# Patient Record
Sex: MALE | Race: WHITE | Employment: PART TIME | URBAN - METROPOLITAN AREA
[De-identification: names, ages, dates, MRNs, and addresses within clinical notes are randomized per-mention and may not be internally consistent; named-entity substitution may affect disease eponyms.]

---

## 2017-04-11 ENCOUNTER — TELEPHONE (OUTPATIENT)
Dept: OTHER | Facility: CLINIC | Age: 37
End: 2017-04-11

## 2017-04-11 NOTE — TELEPHONE ENCOUNTER
4/11/2017    Call Regarding Onboarding [facility]    Attempt 1    Message on voicemail     Comments: NO DEP        Outreach   Jaclyn Dixon

## 2017-05-31 NOTE — TELEPHONE ENCOUNTER
5/31/2017    Call Regarding Onboarding [facility]    Attempt 2    Message on voicemail     Comments:       Outreach   bety

## 2017-06-07 NOTE — TELEPHONE ENCOUNTER
Call Regarding [facility]boarding [facility] CHO[facility]    Attempt 3    Message on voicemail     Comments:       Outreach   Amelia Padilla

## 2018-12-15 ENCOUNTER — OFFICE VISIT (OUTPATIENT)
Dept: URGENT CARE | Facility: URGENT CARE | Age: 38
End: 2018-12-15
Payer: COMMERCIAL

## 2018-12-15 VITALS
HEART RATE: 69 BPM | OXYGEN SATURATION: 99 % | DIASTOLIC BLOOD PRESSURE: 103 MMHG | RESPIRATION RATE: 18 BRPM | TEMPERATURE: 97.6 F | WEIGHT: 173 LBS | SYSTOLIC BLOOD PRESSURE: 162 MMHG

## 2018-12-15 DIAGNOSIS — I10 UNCONTROLLED HYPERTENSION: ICD-10-CM

## 2018-12-15 DIAGNOSIS — R51.9 SUDDEN ONSET OF SEVERE HEADACHE: Primary | ICD-10-CM

## 2018-12-15 PROCEDURE — 99203 OFFICE O/P NEW LOW 30 MIN: CPT | Performed by: FAMILY MEDICINE

## 2018-12-15 SDOH — HEALTH STABILITY: MENTAL HEALTH: HOW OFTEN DO YOU HAVE A DRINK CONTAINING ALCOHOL?: NEVER

## 2018-12-15 NOTE — PATIENT INSTRUCTIONS
Very severe pounding headache  Had an episode of confusion-searching for words this morning  Elevated blood pressure  Concern for severity of headache and need for advanced imaging and safely reducing bp.  Recommend ER evaluation.

## 2018-12-15 NOTE — NURSING NOTE
Chief Complaint   Patient presents with     Headache     bad headache per pt x 12 hrs now, having issues with vision and thinking, was given BP meds in the past to help but not taking it any more causing headaches to be worst now.       Initial BP (!) 162/103   Pulse 69   Temp 97.6  F (36.4  C) (Oral)   Resp 18   Wt 78.5 kg (173 lb)   SpO2 99%  There is no height or weight on file to calculate BMI.  Medication Reconciliation: complete      Farhana Delcid MA

## 2018-12-15 NOTE — PROGRESS NOTES
Chief complaint: headache     Has a history of hypertension  Was on meds to help with BP and headache and they got better  Was on 2 meds   Has been off them for the past 6 months    The past 3 weeks has been getting periodically  Has had pounding headache for 12 hours  Would see spots and bright lights  Been very nauseous    Accompanied by wife    At around 10 am was very severe patient was trying to talk to his wife on the phone and he couldn't find the words  He had to hang up on the phone then texted his wife    Because of persistent symptoms patient here    No Known Allergies    Past Medical History:   Diagnosis Date     NO ACTIVE PROBLEMS          No current outpatient medications on file prior to visit.  No current facility-administered medications on file prior to visit.     Social History     Tobacco Use     Smoking status: Never Smoker     Smokeless tobacco: Never Used   Substance Use Topics     Alcohol use: Yes     Frequency: Never     Comment: OCC     Drug use: No       ROS:  No fevers or chills chest pain or shortness of breath  No thoughts of harming self or others.     OBJECTIVE:  BP (!) 162/103   Pulse 69   Temp 97.6  F (36.4  C) (Oral)   Resp 18   Wt 78.5 kg (173 lb)   SpO2 99%    General:   awake, alert, and cooperative.  NAD.   Head: Normocephalic, atraumatic.  Eyes: Conjunctiva clear,   Heart: Regular rate and rhythm. No murmur.  Lungs: Chest is clear; no wheezes or rales.   Abdomen: soft non-tender.  Neuro: Alert and oriented - normal speech.  Cranial nerves were intact. MMT 5/5 bilateral upper and lower extremities. Sensory was intact. No gross neurologic deficits. Normal gait and cerebellar function. No meningeal signs  MS: Using extremities freely  PSYCH:  Normal affect, normal speech  SKIN: no obvious rashes    ASSESSMENT:    ICD-10-CM    1. Sudden onset of severe headache R51        ICD-10-CM    1. Sudden onset of severe headache R51    2. Uncontrolled hypertension I10        PLAN:      Very severe pounding headache  Had an episode of confusion-searching for words this morning  Elevated blood pressure  Concern for severity of headache and need for advanced imaging and safely reducing bp.  Recommend ER evaluation.  They are not sure which ER they will go to at this time  I advised and recommended ambulance transfer. Patient refused ambulance transfer. Warned about risks to patient and to others on the road and this was discussed in detail however patient still refused.   Wife will drive   Sobia Killian MD

## 2018-12-18 ENCOUNTER — OFFICE VISIT (OUTPATIENT)
Dept: FAMILY MEDICINE | Facility: CLINIC | Age: 38
End: 2018-12-18
Payer: COMMERCIAL

## 2018-12-18 VITALS
WEIGHT: 167.4 LBS | HEART RATE: 74 BPM | TEMPERATURE: 98.3 F | RESPIRATION RATE: 20 BRPM | DIASTOLIC BLOOD PRESSURE: 88 MMHG | OXYGEN SATURATION: 98 % | SYSTOLIC BLOOD PRESSURE: 130 MMHG

## 2018-12-18 DIAGNOSIS — Z13.220 LIPID SCREENING: ICD-10-CM

## 2018-12-18 DIAGNOSIS — Z11.4 SCREENING FOR HIV (HUMAN IMMUNODEFICIENCY VIRUS): ICD-10-CM

## 2018-12-18 DIAGNOSIS — I10 BENIGN ESSENTIAL HYPERTENSION: Primary | ICD-10-CM

## 2018-12-18 DIAGNOSIS — Z23 NEED FOR PROPHYLACTIC VACCINATION AND INOCULATION AGAINST INFLUENZA: ICD-10-CM

## 2018-12-18 PROCEDURE — 90472 IMMUNIZATION ADMIN EACH ADD: CPT | Performed by: PHYSICIAN ASSISTANT

## 2018-12-18 PROCEDURE — 99213 OFFICE O/P EST LOW 20 MIN: CPT | Mod: 25 | Performed by: PHYSICIAN ASSISTANT

## 2018-12-18 PROCEDURE — 90715 TDAP VACCINE 7 YRS/> IM: CPT | Performed by: PHYSICIAN ASSISTANT

## 2018-12-18 PROCEDURE — 90471 IMMUNIZATION ADMIN: CPT | Performed by: PHYSICIAN ASSISTANT

## 2018-12-18 PROCEDURE — 90686 IIV4 VACC NO PRSV 0.5 ML IM: CPT | Performed by: PHYSICIAN ASSISTANT

## 2018-12-18 RX ORDER — LISINOPRIL AND HYDROCHLOROTHIAZIDE 12.5; 2 MG/1; MG/1
1 TABLET ORAL DAILY
Qty: 30 TABLET | Refills: 1 | Status: SHIPPED | OUTPATIENT
Start: 2018-12-18 | End: 2019-02-21

## 2018-12-18 NOTE — PROGRESS NOTES
SUBJECTIVE:   Carlos Alberto Salazar is a 38 year old male who presents to clinic today for the following health issues:      Hypertension Follow-up      Outpatient blood pressures are not being checked.    Low Salt Diet: not monitoring salt      Amount of exercise or physical activity: None    Problems taking medications regularly: No    Medication side effects: none    Diet: regular (no restrictions)    Patient has been off blood pressure medications for about 6 months. Was on:  10-12.5 mg lisinopril- hctz  Metoprolol XR: 50 mg    Had lost significant weight and felt light headed and dizzy at those doses.     Headaches      Duration: x2 months, recurs every 4-5 days    Description  Location: bilateral in the frontal area, bilateral in the temporal area, bilateral in the occipital area   Character: throbbing pain, global  Frequency:  Every 4-5 days  Duration:  12 hours    Intensity:  severe    Accompanying signs and symptoms:    Precipitating or Alleviating factors:  Nausea/vomiting: sometimes  Dizziness: sometimes  Weakness or numbness: no  Visual changes: flashing lights  Fever: no   Sinus or URI symptoms no     History  Head trauma: no   Family history of migraines: no   Previous tests for headaches: no  Neurologist evaluations: no   Able to do daily activities when headache present: no  Wake with headaches: YES- sometimes   Daily pain medication use: no   Any changes in: None    Precipitating or Alleviating factors (light/sound/sleep/caffeine): lights    Therapies tried and outcome: No      Frequent/daily pain medication use: no        Problem list and histories reviewed & adjusted, as indicated.  Additional history: as documented    There is no problem list on file for this patient.    Past Surgical History:   Procedure Laterality Date     NO HISTORY OF SURGERY         Social History     Tobacco Use     Smoking status: Never Smoker     Smokeless tobacco: Never Used   Substance Use Topics     Alcohol use: Yes      Frequency: Never     Comment: OCC     Family History   Problem Relation Age of Onset     Cerebrovascular Disease Father      Coronary Artery Disease Father         around 51 yo      Diabetes Father            Reviewed and updated as needed this visit by clinical staff  Tobacco  Allergies  Meds       Reviewed and updated as needed this visit by Provider         ROS:  Constitutional, cardiac, neuro systems are negative, except as otherwise noted.    OBJECTIVE:                                                    /88   Pulse 74   Temp 98.3  F (36.8  C) (Tympanic)   Resp 20   Wt 75.9 kg (167 lb 6.4 oz)   SpO2 98%   There is no height or weight on file to calculate BMI.  GENERAL APPEARANCE: healthy, alert and no distress  RESP: lungs clear to auscultation - no rales, rhonchi or wheezes  CV: regular rates and rhythm, normal S1 S2, no S3 or S4, no murmur, click or rub, no irregular beats and no bruits heard  MS: extremities normal- no gross deformities noted  PSYCH: mentation appears normal and affect normal/bright       ASSESSMENT:                                                      1. Benign essential hypertension    2. Screening for HIV (human immunodeficiency virus)    3. Need for prophylactic vaccination and inoculation against influenza    4. Lipid screening         PLAN:                                                    Will restart his lisinopril/hctz 20/12.5mg daily. Recheck blood pressure and fasting labs in 1-2 weeks. If everything is within normal range will follow up annually.    The patient was in agreement with the plan today and had no questions or concerns prior to leaving the clinic.     Adrianne Aguayo PA-C  [facility]

## 2018-12-28 DIAGNOSIS — Z13.220 LIPID SCREENING: ICD-10-CM

## 2018-12-28 DIAGNOSIS — I10 BENIGN ESSENTIAL HYPERTENSION: ICD-10-CM

## 2018-12-28 DIAGNOSIS — Z11.4 SCREENING FOR HIV (HUMAN IMMUNODEFICIENCY VIRUS): ICD-10-CM

## 2018-12-28 LAB
ALBUMIN SERPL-MCNC: 4.4 G/DL (ref 3.4–5)
ALP SERPL-CCNC: 99 U/L (ref 40–150)
ALT SERPL W P-5'-P-CCNC: 45 U/L (ref 0–70)
ANION GAP SERPL CALCULATED.3IONS-SCNC: 5 MMOL/L (ref 3–14)
AST SERPL W P-5'-P-CCNC: 28 U/L (ref 0–45)
BILIRUB SERPL-MCNC: 0.7 MG/DL (ref 0.2–1.3)
BUN SERPL-MCNC: 23 MG/DL (ref 7–30)
CALCIUM SERPL-MCNC: 9.4 MG/DL (ref 8.5–10.1)
CHLORIDE SERPL-SCNC: 103 MMOL/L (ref 94–109)
CHOLEST SERPL-MCNC: 260 MG/DL
CO2 SERPL-SCNC: 28 MMOL/L (ref 20–32)
CREAT SERPL-MCNC: 1.05 MG/DL (ref 0.66–1.25)
CREAT UR-MCNC: 202 MG/DL
GFR SERPL CREATININE-BSD FRML MDRD: 89 ML/MIN/{1.73_M2}
GLUCOSE SERPL-MCNC: 95 MG/DL (ref 70–99)
HDLC SERPL-MCNC: 59 MG/DL
HIV 1+2 AB+HIV1 P24 AG SERPL QL IA: NONREACTIVE
LDLC SERPL CALC-MCNC: 168 MG/DL
MICROALBUMIN UR-MCNC: 14 MG/L
MICROALBUMIN/CREAT UR: 6.88 MG/G CR (ref 0–17)
NONHDLC SERPL-MCNC: 201 MG/DL
POTASSIUM SERPL-SCNC: 4.1 MMOL/L (ref 3.4–5.3)
PROT SERPL-MCNC: 8.4 G/DL (ref 6.8–8.8)
SODIUM SERPL-SCNC: 136 MMOL/L (ref 133–144)
TRIGL SERPL-MCNC: 166 MG/DL

## 2018-12-28 PROCEDURE — 80053 COMPREHEN METABOLIC PANEL: CPT | Performed by: PHYSICIAN ASSISTANT

## 2018-12-28 PROCEDURE — 87389 HIV-1 AG W/HIV-1&-2 AB AG IA: CPT | Performed by: PHYSICIAN ASSISTANT

## 2018-12-28 PROCEDURE — 36415 COLL VENOUS BLD VENIPUNCTURE: CPT | Performed by: PHYSICIAN ASSISTANT

## 2018-12-28 PROCEDURE — 80061 LIPID PANEL: CPT | Performed by: PHYSICIAN ASSISTANT

## 2018-12-28 PROCEDURE — 82043 UR ALBUMIN QUANTITATIVE: CPT | Performed by: PHYSICIAN ASSISTANT

## 2018-12-31 ENCOUNTER — TELEPHONE (OUTPATIENT)
Dept: FAMILY MEDICINE | Facility: CLINIC | Age: 38
End: 2018-12-31

## 2018-12-31 DIAGNOSIS — E78.5 HYPERLIPIDEMIA LDL GOAL <130: Primary | ICD-10-CM

## 2018-12-31 NOTE — TELEPHONE ENCOUNTER
Please call patient with the following info:    Only abnormality in his labs is his cholesterol. LDL elevated with his goal being <130. He can try making changes to his diet and we can recheck his cholesterol in 6 months. If still elevated we can go from there. Lab only ok

## 2018-12-31 NOTE — TELEPHONE ENCOUNTER
Attempt #1:  Message left on VM to return call to clinic at [phone] or [phone].    Avelina Garcia, RN, BSN, PHN

## 2019-01-02 NOTE — TELEPHONE ENCOUNTER
Results note read as written.   Verbalized understanding.   No further questions at this time.     Avelina Garcia RN

## 2019-02-21 DIAGNOSIS — I10 BENIGN ESSENTIAL HYPERTENSION: ICD-10-CM

## 2019-02-21 RX ORDER — LISINOPRIL AND HYDROCHLOROTHIAZIDE 12.5; 2 MG/1; MG/1
1 TABLET ORAL DAILY
Qty: 30 TABLET | Refills: 0 | Status: SHIPPED | OUTPATIENT
Start: 2019-02-21 | End: 2019-03-22

## 2019-02-21 NOTE — TELEPHONE ENCOUNTER
lisinopril-hydrochlorothiazide (PRINZIDE/ZESTORETIC) 20-12.5mg      Last Written Prescription Date:  02/18/18  Last Fill Quantity: 30,   # refills: 1  Last Office Visit: 12/18/18  Future Office visit:       Thank You,  Maria Isabel Banks, Pharmacy Tech  [facility]/ [facility]

## 2019-02-21 NOTE — TELEPHONE ENCOUNTER
Routing refill request to provider for review/approval because:  Patient did not follow up as instructed      OV 12-18-18  Will restart his lisinopril/hctz 20/12.5mg daily. Recheck blood pressure and fasting labs in 1-2 weeks. If everything is within normal range will follow up annually    BP Readings from Last 3 Encounters:   12/18/18 130/88   12/15/18 (!) 162/103

## 2019-03-22 ENCOUNTER — OFFICE VISIT (OUTPATIENT)
Dept: FAMILY MEDICINE | Facility: CLINIC | Age: 39
End: 2019-03-22
Payer: COMMERCIAL

## 2019-03-22 VITALS
TEMPERATURE: 97.1 F | OXYGEN SATURATION: 98 % | HEIGHT: 69 IN | WEIGHT: 178.4 LBS | RESPIRATION RATE: 16 BRPM | DIASTOLIC BLOOD PRESSURE: 88 MMHG | SYSTOLIC BLOOD PRESSURE: 126 MMHG | BODY MASS INDEX: 26.42 KG/M2 | HEART RATE: 90 BPM

## 2019-03-22 DIAGNOSIS — Z13.220 LIPID SCREENING: ICD-10-CM

## 2019-03-22 DIAGNOSIS — F41.8 SITUATIONAL ANXIETY: ICD-10-CM

## 2019-03-22 DIAGNOSIS — I10 BENIGN ESSENTIAL HYPERTENSION: Primary | ICD-10-CM

## 2019-03-22 DIAGNOSIS — E78.5 HYPERLIPIDEMIA LDL GOAL <130: ICD-10-CM

## 2019-03-22 LAB
CHOLEST SERPL-MCNC: 272 MG/DL
HDLC SERPL-MCNC: 65 MG/DL
LDLC SERPL CALC-MCNC: 184 MG/DL
NONHDLC SERPL-MCNC: 207 MG/DL
TRIGL SERPL-MCNC: 117 MG/DL

## 2019-03-22 PROCEDURE — 80061 LIPID PANEL: CPT | Performed by: NURSE PRACTITIONER

## 2019-03-22 PROCEDURE — 99214 OFFICE O/P EST MOD 30 MIN: CPT | Performed by: NURSE PRACTITIONER

## 2019-03-22 PROCEDURE — 36415 COLL VENOUS BLD VENIPUNCTURE: CPT | Performed by: NURSE PRACTITIONER

## 2019-03-22 RX ORDER — LISINOPRIL AND HYDROCHLOROTHIAZIDE 12.5; 2 MG/1; MG/1
1 TABLET ORAL DAILY
Qty: 90 TABLET | Refills: 3 | Status: SHIPPED | OUTPATIENT
Start: 2019-03-22 | End: 2020-05-06

## 2019-03-22 RX ORDER — LISINOPRIL AND HYDROCHLOROTHIAZIDE 12.5; 2 MG/1; MG/1
1 TABLET ORAL DAILY
Qty: 30 TABLET | Refills: 0 | Status: SHIPPED | OUTPATIENT
Start: 2019-03-22 | End: 2019-03-22

## 2019-03-22 ASSESSMENT — MIFFLIN-ST. JEOR: SCORE: 1724.21

## 2019-03-22 NOTE — PROGRESS NOTES
SUBJECTIVE:   Carlos Alberto Salazar is a 38 year old male who presents to clinic today for the following health issues:    Hyperlipidemia Follow-Up      Rate your low fat/cholesterol diet?: not monitoring fat    Taking statin?  No    Other lipid medications/supplements?:  none    Hypertension Follow-up      Outpatient blood pressures are not being checked.    Low Salt Diet: not monitoring salt      Amount of exercise or physical activity: None    Problems taking medications regularly: No    Medication side effects: none    Diet: regular (no restrictions)    Reports having increased anxiety related to his mother dying 3 weeks ago from a sudden MI. Does not want to treat at this time as he feels it is slowly improving.     Problem list and histories reviewed & adjusted, as indicated.  Additional history: as documented    Patient Active Problem List   Diagnosis     Benign essential hypertension     Hyperlipidemia LDL goal <130     Situational anxiety     Past Surgical History:   Procedure Laterality Date     NO HISTORY OF SURGERY         Social History     Tobacco Use     Smoking status: Never Smoker     Smokeless tobacco: Never Used   Substance Use Topics     Alcohol use: Yes     Frequency: Never     Comment: OCC     Family History   Problem Relation Age of Onset     Cerebrovascular Disease Father      Coronary Artery Disease Father         around 51 yo      Diabetes Father          Current Outpatient Medications   Medication Sig Dispense Refill     lisinopril-hydrochlorothiazide (PRINZIDE/ZESTORETIC) 20-12.5 MG tablet Take 1 tablet by mouth daily 90 tablet 3     No Known Allergies  Recent Labs   Lab Test 12/28/18  0913   *   HDL 59   TRIG 166*   ALT 45   CR 1.05   GFRESTIMATED 89   GFRESTBLACK >90   POTASSIUM 4.1      BP Readings from Last 3 Encounters:   03/22/19 126/88   12/18/18 130/88   12/15/18 (!) 162/103    Wt Readings from Last 3 Encounters:   03/22/19 80.9 kg (178 lb 6.4 oz)   12/18/18 75.9 kg (167 lb 6.4  "oz)   12/15/18 78.5 kg (173 lb)                  Labs reviewed in EPIC    Reviewed and updated as needed this visit by clinical staff  Tobacco  Allergies  Meds  Problems  Med Hx  Surg Hx  Fam Hx  Soc Hx        Reviewed and updated as needed this visit by Provider  Tobacco  Allergies  Meds  Problems  Med Hx  Surg Hx  Fam Hx         ROS:  Constitutional, HEENT, cardiovascular, pulmonary, GI, , musculoskeletal, neuro, skin, endocrine and psych systems are negative, except as otherwise noted.    OBJECTIVE:     /88   Pulse 90   Temp 97.1  F (36.2  C) (Oral)   Resp 16   Ht 1.76 m (5' 9.29\")   Wt 80.9 kg (178 lb 6.4 oz)   SpO2 98%   BMI 26.12 kg/m    Body mass index is 26.12 kg/m .  GENERAL: healthy, alert and no distress  EYES: Eyes grossly normal to inspection, PERRL and conjunctivae and sclerae normal  NECK: no adenopathy, no asymmetry, masses, or scars and thyroid normal to palpation  RESP: lungs clear to auscultation - no rales, rhonchi or wheezes  CV: regular rate and rhythm, normal S1 S2, no S3 or S4, no murmur, click or rub, no peripheral edema and peripheral pulses strong  MS: no gross musculoskeletal defects noted, no edema, no CVA tenderness  PSYCH: mentation appears normal, affect normal/bright POSITIVE for on the verge of crying several times during visit when discussing mother.     Diagnostic Test Results:  see orders    ASSESSMENT/PLAN:         ICD-10-CM    1. Benign essential hypertension I10 lisinopril-hydrochlorothiazide (PRINZIDE/ZESTORETIC) 20-12.5 MG tablet     DISCONTINUED: lisinopril-hydrochlorothiazide (PRINZIDE/ZESTORETIC) 20-12.5 MG tablet   2. Lipid screening Z13.220 Lipid panel reflex to direct LDL Fasting   3. Situational anxiety F41.8    4. Hyperlipidemia LDL goal <130 E78.5        See Patient Instructions: discussed take medication as prescribed. Follow up if you have questions or concerns; or if your anxiety worsens.     Nakia Reddy, P  [facility] " [place]

## 2019-03-22 NOTE — LETTER
March 27, 2019      Carlos Alberto Salazar  [address]  [place] [address]        Dear ,    We are writing to inform you of your test results.    Thank you for your recent office visit.     Here are your recent results. Your cholesterol is higher than we would like. Work on diet, exercise, weight loss. We should repeat fasting labs in 6 months to 1 year. If your LDL goes above 190, we should start cholesterol lowering medication. Consider starting a daily fish oil supplement as well.     Resulted Orders   Lipid panel reflex to direct LDL Fasting   Result Value Ref Range    Cholesterol 272 (H) <200 mg/dL      Comment:      Desirable:       <200 mg/dl    Triglycerides 117 <150 mg/dL      Comment:      Fasting specimen    HDL Cholesterol 65 >39 mg/dL    LDL Cholesterol Calculated 184 (H) <100 mg/dL      Comment:      Above desirable:  100-129 mg/dl  Borderline High:  130-159 mg/dL  High:             160-189 mg/dL  Very high:       >189 mg/dl      Non HDL Cholesterol 207 (H) <130 mg/dL      Comment:      Above Desirable:  130-159 mg/dl  Borderline high:  160-189 mg/dl  High:             190-219 mg/dl  Very high:       >219 mg/dl         If you have any questions or concerns, please call the clinic at the number listed above.       Sincerely,        Nakia Reddy NP/honey

## 2019-03-22 NOTE — PATIENT INSTRUCTIONS
Patient Education     Established High Blood Pressure    High blood pressure (hypertension) is a chronic disease. Often, healthcare providers don t know what causes it. But it can be caused by certain health conditions and medicines.  If you have high blood pressure, you may not have any symptoms. If you do have symptoms, they may include headache, dizziness, changes in your vision, chest pain, and shortness of breath. But even without symptoms, high blood pressure that s not treated raises your risk for heart attack, heart failure, and stroke. High blood pressure is a serious health risk and shouldn t be ignored.  Blood pressure measurements are given as 2 numbers. Systolic blood pressure is the upper number. This is the pressure when the heart contracts. Diastolic blood pressure is the lower number. This is the pressure when the heart relaxes between beats. You will see your blood pressure readings written together. For example, a person with a systolic pressure of 118 and a diastolic pressure of 78 will have 118/78 written in the medical record.  Blood pressure is categorized as normal, elevated, or stage 1 or stage 2 high blood pressure:    Normal blood pressure is systolic of less than 120 and diastolic of less than 80 (120/80)    Elevated blood pressure is systolic of 120 to 129 and diastolic less than 80    Stage 1 high blood pressure is systolic is 130 to 139 or diastolic between 80 to 89    Stage 2 high blood pressure is when systolic is 140 or higher or the diastolic is 90 or higher  Home care  If you have high blood pressure, follow these home care guidelines to help lower your blood pressure. If you are taking medicines for high blood pressure, these methods may reduce or end your need for medicines in the future.    Start a weight-loss program if you are overweight.    Cut back on how much salt you get in your diet. Here s how to do this:  ? Don t eat foods that have a lot of salt. These include  olives, pickles, smoked meats, and salted potato chips.  ? Don t add salt to your food at the table.  ? Use only small amounts of salt when cooking.    Start an exercise program. Talk with your healthcare provider about the type of exercise program that would be best for you. It doesn't have to be hard. Even brisk walking for 20 minutes 3 times a week is a good form of exercise.    Don t take medicines that stimulate the heart. This includes many over-the-counter cold and sinus decongestant pills and sprays, as well as diet pills. Check the warnings about high blood pressure on the label. Before buying any over-the-counter medicines or supplements, always ask the pharmacist about the product's potential interaction with your high blood pressure and your high blood pressure medicines.    Stimulants such as amphetamine or cocaine could be deadly for someone with high blood pressure. Never take these.    Limit how much caffeine you get in your diet. Switch to caffeine-free products.    Stop smoking. If you are a long-time smoker, this can be hard. Talk to your healthcare provider about medicines and nicotine replacement options to help you. Also, enroll in a stop-smoking program to make it more likely that you will quit for good.    Learn how to handle stress. This is an important part of any program to lower blood pressure. Learn about relaxation methods like meditation, yoga, or biofeedback.    If your provider prescribed medicines, take them exactly as directed. Missing doses may cause your blood pressure get out of control.    If you miss a dose or doses, check with your healthcare provider or pharmacist about what to do.    Consider buying an automatic blood pressure machine to check your blood pressure at home. Ask your provider for a recommendation. You can get one of these at most pharmacies.     The American Heart Association recommends the following guidelines for home blood pressure monitoring:    Don't  smoke or drink coffee for 30 minutes before taking your blood pressure.    Go to the bathroom before the test.    Relax for 5 minutes before taking the measurement.    Sit with your back supported (don't sit on a couch or soft chair); keep your feet on the floor uncrossed. Place your arm on a solid flat surface (like a table) with the upper part of the arm at heart level. Place the middle of the cuff directly above the bend of the elbow. Check the monitor's instruction manual for an illustration.    Take multiple readings. When you measure, take 2 to 3 readings one minute apart and record all of the results.    Take your blood pressure at the same time every day, or as your healthcare provider recommends.    Record the date, time, and blood pressure reading.    Take the record with you to your next medical appointment. If your blood pressure monitor has a built-in memory, simply take the monitor with you to your next appointment.    Call your provider if you have several high readings. Don't be frightened by a single high blood pressure reading, but if you get several high readings, check in with your healthcare provider.    Note: When blood pressure reaches a systolic (top number) of 180 or higher OR diastolic (bottom number) of 110 or higher, seek emergency medical treatment.  Follow-up care  You will need to see your healthcare provider regularly. This is to check your blood pressure and to make changes to your medicines. Make a follow-up appointment as directed. Bring the record of your home blood pressure readings to the appointment.  When to seek medical advice  Call your healthcare provider right away if any of these occur:    Blood pressure reaches a systolic (upper number) of 180 or higher OR a diastolic (bottom number) of 110 or higher    Chest pain or shortness of breath    Severe headache    Throbbing or rushing sound in the ears    Nosebleed    Sudden severe pain in your belly (abdomen)    Extreme  "drowsiness, confusion, or fainting    Dizziness or spinning sensation (vertigo)    Weakness of an arm or leg or one side of the face    You have problems speaking or seeing   Date Last Reviewed: 12/1/2016 2000-2018 [facility]. [address] [place] [address]. All rights reserved. This information is not intended as a substitute for professional medical care. Always follow your healthcare professional's instructions.           Patient Education   Mental Health Crisis Numbers  [facility]  If you have a mental health or substance abuse crisis on a weekend or after hours, please use any of the resources below.  General numbers  911 emergency services  211 First Call for Help  [facility]  [address] [place] [id number] [phone]  Crisis Connection Hotline  377.231.4575 or 1-887.527.4525  National Suicide Prevention Lifeline  2-994-562-TALK (6108)  YOP0RZOA  Text crisis line for teens. Text \"LIFE\" to 849033  [facility] mobile crisis services  These services will come to you. Call the county where the child is physically located.    [place] (adult only): [phone]    [place] (child and adult): [phone]    [place] (child and adult): [phone]    [place] (child): [phone]    [place] (adult): [phone]    [place] (child): [phone]    [place] (Adult): [phone]    [place] (child and adult): [phone]    [place] (child and adult): [phone] or 1-101.894.2537  Other crisis resources (not mobile)  [facility]  8-017-738-3510  [place] Crisis Hotline  904.736.3310 or 1-414.952.6025  [facility] (formerly known as the [facility])  Offers walk-in and telephone crisis intervention services for adults.  [address] " [address]  [phone] or 700-788-0332 (suicide hotline)  Short-term residential crisis resources  [facility] (ages 10 to 17)  [address] [place] [address] [phone]  Suggested inpatient hospitalization   [address] [address]  [phone]  For informational purposes only. Not to replace the advice of your health care provider.  Copyright   2014 [facility]. All rights reserved. [employer] 136851 - REV 09/15.

## 2019-03-26 NOTE — RESULT ENCOUNTER NOTE
Please send letter.     Robert Carcamo,    Thank you for your recent office visit.    Here are your recent results.  Your cholesterol is higher than we would like.  Work on diet, exercise, weight loss.  We should repeat fasting labs in 6 months to 1 year.  If your LDL goes above 190, we should start cholesterol lowering medication.  Consider starting a daily fish oil supplement as well.     Feel free to contact me via [employer] or call the clinic at [phone].    Sincerely,    MASTER Hirsch, FNP-BC

## 2019-05-01 ENCOUNTER — OFFICE VISIT (OUTPATIENT)
Dept: FAMILY MEDICINE | Facility: CLINIC | Age: 39
End: 2019-05-01
Payer: COMMERCIAL

## 2019-05-01 VITALS
HEIGHT: 69 IN | BODY MASS INDEX: 25.98 KG/M2 | WEIGHT: 175.4 LBS | OXYGEN SATURATION: 98 % | RESPIRATION RATE: 18 BRPM | DIASTOLIC BLOOD PRESSURE: 70 MMHG | TEMPERATURE: 97.2 F | HEART RATE: 68 BPM | SYSTOLIC BLOOD PRESSURE: 124 MMHG

## 2019-05-01 DIAGNOSIS — K40.20 NON-RECURRENT BILATERAL INGUINAL HERNIA WITHOUT OBSTRUCTION OR GANGRENE: Primary | ICD-10-CM

## 2019-05-01 PROCEDURE — 99213 OFFICE O/P EST LOW 20 MIN: CPT | Performed by: PHYSICIAN ASSISTANT

## 2019-05-01 ASSESSMENT — MIFFLIN-ST. JEOR: SCORE: 1705.6
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
## 2019-05-01 NOTE — PATIENT INSTRUCTIONS
Patient Education     Hernia (Adult)    A hernia can happen when there is a weakness or defect in the wall of the abdomen or groin. Intestines or nearby tissues may move from their usual location and push through the weakness in the wall. This can cause a hernia (bulge) you may see or feel.  Causes and risk factors   A hernia may be present at birth. Or it may be caused by the wear and tear of daily living. Certain factors can make a hernia more likely. These can include:    Heavy lifting    Straining, whether from lifting, movement, or constipation    Chronic cough    Injury to the abdominal wall    Excess weight    Pregnancy    Prior surgery    Older age    Family history of hernia  Symptoms  Symptoms of a hernia may come on suddenly. Or they may appear slowly over time. Some common symptoms include:    Bulge in the groin area, around the navel, or in the scrotum (the bulge may get bigger when you stand and go away when you lie down)    Pain or pressure around the bulge    Pain during activities such as lifting, coughing, or sneezing    A feeling of weakness or pressure in the groin    Pain or swelling in the scrotum  Types of hernias  There are different types of hernia. The type you have depends on its location:    Inguinal. This type is in the groin or scrotum. It is more common in men. But, women can get this hernia, too.    Femoral. This type is in the groin, upper thigh (where the leg bends), or labia. It is more common in women.    Ventral. This type is in the abdominal wall.    Umbilical. This type occurs around the navel (belly button).    Incisional. This type occurs at the site of a previous surgery.  The condition of the hernia can help determine how urgently it needs to be treated.    Reducible. It goes back in by itself, or it can be pushed back in.    Irreducible. It can t be pushed back in.    Incarcerated/strangulated. The intestine is trapped (incarcerated). If this happens, you won t be able  to push the bulge back in. If the incarcerated hernia isn t treated, it may become strangulated. This means the area loses blood supply and the tissue may die. This requires emergency surgery. You need treatment right away.  In most cases, a hernia will not heal on its own.You may need surgery to repair the defect in the abdominal wall or groin. You ll be told more about surgery, if needed.  If your symptoms are not severe, treatment may sometimes be delayed. In such cases, you will need regular follow-up visits with the provider. You ll be asked to keep track of your symptoms and to watch for signs of more serious problems. You may also be given guidelines similar to the home care instructions below.  Home care  To help keep a hernia from getting worse, you may be advised to:    Avoid heavy lifting and straining as directed.    Take steps to prevent constipation, such as eating more fiber and drinking more water. This may help reduce straining that can occur when having a bowel movement. Reducing straining may help keep your symptoms from getting worse.    Maintain a healthy weight or lose excess weight. This can help reduce strain on abdominal muscles and tissues.    Stop smoking. This can help prevent coughing that may also strain abdominal muscles and tissues.  Follow-up care  Follow up with your healthcare provider, or as directed. If imaging tests were done, they will be reviewed a doctor. You will be told the results and any new findings that may affect your care.  When to seek medical advice  Call your healthcare provider right away if any of these occur:    Hernia hardens, swells, or grows larger    Hernia can no longer be pushed back in    Pain moves to the lower right abdomen (just below the waistline), or spreads to the back  Call 911  Call 911 if any of these occur:    Severe pain, redness, or tenderness in the area near the hernia    Pain worsens quickly and doesn t get better    Inability to have a  bowel movement or pass gas    Fever of 100.4 F (38 C) or higher, or as directed by your healthcare provider  Date Last Reviewed: 3/1/2018    3420-3438 [employer]. [address] [place] [address], [place] [address]. All rights reserved. This information is not intended as a substitute for professional medical care. Always follow your healthcare professional's instructions.

## 2019-05-06 ENCOUNTER — OFFICE VISIT (OUTPATIENT)
Dept: SURGERY | Facility: CLINIC | Age: 39
End: 2019-05-06
Payer: COMMERCIAL

## 2019-05-06 ENCOUNTER — TELEPHONE (OUTPATIENT)
Dept: SURGERY | Facility: CLINIC | Age: 39
End: 2019-05-06

## 2019-05-06 VITALS
HEART RATE: 75 BPM | SYSTOLIC BLOOD PRESSURE: 135 MMHG | WEIGHT: 179 LBS | HEIGHT: 69 IN | BODY MASS INDEX: 26.51 KG/M2 | DIASTOLIC BLOOD PRESSURE: 87 MMHG

## 2019-05-06 DIAGNOSIS — K40.20 NON-RECURRENT BILATERAL INGUINAL HERNIA WITHOUT OBSTRUCTION OR GANGRENE: Primary | ICD-10-CM

## 2019-05-06 PROCEDURE — 99244 OFF/OP CNSLTJ NEW/EST MOD 40: CPT | Mod: 57 | Performed by: SURGERY

## 2019-05-06 ASSESSMENT — MIFFLIN-ST. JEOR: SCORE: 1717.32
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
## 2020-05-05 DIAGNOSIS — I10 BENIGN ESSENTIAL HYPERTENSION: ICD-10-CM

## 2020-05-06 RX ORDER — LISINOPRIL AND HYDROCHLOROTHIAZIDE 12.5; 2 MG/1; MG/1
TABLET ORAL
Qty: 30 TABLET | Refills: 0 | Status: SHIPPED | OUTPATIENT
Start: 2020-05-06 | End: 2020-05-14

## 2020-05-06 NOTE — TELEPHONE ENCOUNTER
Will give one month refill of bp meds, but needs lab appt for chemistry before further refills can be given. Please call and set up ancillary appt for him. Lab ordered. ELLIOT Stephen on 5/6/2020 at 1:23 PM

## 2020-05-11 DIAGNOSIS — I10 BENIGN ESSENTIAL HYPERTENSION: ICD-10-CM

## 2020-05-11 LAB
ANION GAP SERPL CALCULATED.3IONS-SCNC: 11 MMOL/L (ref 3–14)
BUN SERPL-MCNC: 18 MG/DL (ref 7–30)
CALCIUM SERPL-MCNC: 9.7 MG/DL (ref 8.5–10.1)
CHLORIDE SERPL-SCNC: 101 MMOL/L (ref 94–109)
CO2 SERPL-SCNC: 25 MMOL/L (ref 20–32)
CREAT SERPL-MCNC: 1.01 MG/DL (ref 0.66–1.25)
GFR SERPL CREATININE-BSD FRML MDRD: >90 ML/MIN/{1.73_M2}
GLUCOSE SERPL-MCNC: 93 MG/DL (ref 70–99)
POTASSIUM SERPL-SCNC: 3.6 MMOL/L (ref 3.4–5.3)
SODIUM SERPL-SCNC: 137 MMOL/L (ref 133–144)

## 2020-05-11 PROCEDURE — 36415 COLL VENOUS BLD VENIPUNCTURE: CPT | Performed by: PHYSICIAN ASSISTANT

## 2020-05-11 PROCEDURE — 80048 BASIC METABOLIC PNL TOTAL CA: CPT | Performed by: PHYSICIAN ASSISTANT

## 2020-05-13 ENCOUNTER — TELEPHONE (OUTPATIENT)
Dept: FAMILY MEDICINE | Facility: CLINIC | Age: 40
End: 2020-05-13

## 2020-05-13 NOTE — TELEPHONE ENCOUNTER
Patient needs E visit, phone or video visit (preferred) before further refills of meds. Please schedule with me or other provider. Thanks.    ELLIOT Singleton

## 2020-05-14 ENCOUNTER — VIRTUAL VISIT (OUTPATIENT)
Dept: FAMILY MEDICINE | Facility: CLINIC | Age: 40
End: 2020-05-14
Payer: COMMERCIAL

## 2020-05-14 DIAGNOSIS — I10 BENIGN ESSENTIAL HYPERTENSION: Primary | ICD-10-CM

## 2020-05-14 DIAGNOSIS — G44.89 OTHER HEADACHE SYNDROME: ICD-10-CM

## 2020-05-14 PROCEDURE — 99214 OFFICE O/P EST MOD 30 MIN: CPT | Mod: 95 | Performed by: PHYSICIAN ASSISTANT

## 2020-05-14 RX ORDER — LISINOPRIL AND HYDROCHLOROTHIAZIDE 12.5; 2 MG/1; MG/1
1 TABLET ORAL DAILY
Qty: 90 TABLET | Refills: 3 | Status: SHIPPED | OUTPATIENT
Start: 2020-05-14 | End: 2021-06-25

## 2020-05-14 NOTE — PATIENT INSTRUCTIONS
"Lili Carcamo,    Thank you for allowing [facility] to manage your care.    Continue taking your blood pressure pill daily.     Keep hydrated with 8-10 glasses of water a day. Avoid sugary drinks.    Follow up with us in one year or sooner, if needed. Go to ER if you develop a severe, sudden onset, or worrisome headache.    I sent your prescriptions to your pharmacy.    If you have any questions or concerns, please feel free to call us at [phone]    Sincerely,    Evan Loco PA-C    Did you know?  You can schedule an e-Visit for certain simple non-emergent issue for your convenience.  To learn more about or start an eVisit, simply login to [employer], click  Visits  on top banner, click  Start a Virtual Visit  drop down, and click  Symptom-Specific E-Visit       Patient Education     Discharge Instructions: Taking Blood Pressure Medications  You have been diagnosed with high blood pressure (hypertension). Diet and exercise help control high blood pressure. Many people also need the help of one or more medicines. Here are the main types of blood pressure medicines and how they work.  Diuretics  Diuretics are sometimes called \"water pills\" because they work in the kidney and flush excess water and sodium (salt) from the body. These are one of the most common and  important medicines for the management of blood pressure.  Beta-blockers  Beta-blockers reduce nerve impulses to the heart and blood vessels. This makes the heart beat slower and with less force. Your blood pressure drops, and your heart does not have to work as hard, which may improve pumping function.  ACE inhibitors  ACE inhibitors cause the vessels to relax. This helps the blood flow better and lowers blood pressure.  Angiotensin antagonists  Angiotensin antagonists shield blood vessels from a hormone that causes the blood vessels to get stiff and narrow. Your vessels become wider, and your blood pressure goes down.  Calcium channel " blockers (CCBs)  CCBs keep calcium from entering the muscle cells of the heart and blood vessels. This causes blood vessels to relax, and your blood pressure goes down.  Alpha-blockers  Alpha-blockers reduce nerve impulses to blood vessels. This allows blood to pass easily, causing blood pressure to go down.  Alpha-beta blockers  Alpha-beta blockers work the same way as alpha-blockers but also slow your heartbeat. As a result, less blood is pumped through your blood vessels and your blood pressure goes down.  Vasodilators  Vasodilators directly open blood vessels by relaxing the muscle in the vessel walls, causing blood pressure to go down.  Date Last Reviewed: 4/27/2016 2000-2019 [employer]. [address] [place] [address], [place] [address]. All rights reserved. This information is not intended as a substitute for professional medical care. Always follow your healthcare professional's instructions.

## 2020-05-14 NOTE — PROGRESS NOTES
"Carlos Alberto Salazar is a 40 year old male who is being evaluated via a billable telephone visit.      The patient has been notified of following:     \"This telephone visit will be conducted via a call between you and your physician/provider. We have found that certain health care needs can be provided without the need for a physical exam.  This service lets us provide the care you need with a short phone conversation.  If a prescription is necessary we can send it directly to your pharmacy.  If lab work is needed we can place an order for that and you can then stop by our lab to have the test done at a later time.    Telephone visits are billed at different rates depending on your insurance coverage. During this emergency period, for some insurers they may be billed the same as an in-person visit.  Please reach out to your insurance provider with any questions.    If during the course of the call the physician/provider feels a telephone visit is not appropriate, you will not be charged for this service.\"    Patient has given verbal consent for Telephone visit?  Yes    What phone number would you like to be contacted at? [phone]    How would you like to obtain your AVS? Mail a copy    Subjective     Carlos Alberto Salazar is a 40 year old male who presents to clinic today for the following health issues:    HPI  Hypertension Follow-up  Misses 1-2 doses per month. Tolerating the medication well. Gets mild headaches when he has missed his medication for two days in a row. Headaches resolve when restarting the medication and he is not concerned about them.    Do you check your blood pressure regularly outside of the clinic? Yes     Are you following a low salt diet? Yes    Are your blood pressures ever more than 140 on the top number (systolic) OR more   than 90 on the bottom number (diastolic), for example 140/90? No      How many servings of fruits and vegetables do you eat daily?  2-3    On average, how many sweetened " beverages do you drink each day (Examples: soda, juice, sweet tea, etc.  Do NOT count diet or artificially sweetened beverages)?   3    How many days per week do you exercise enough to make your heart beat faster? 7    How many minutes a day do you exercise enough to make your heart beat faster? 30 - 60    How many days per week do you miss taking your medication? 0    Patient Active Problem List   Diagnosis     Benign essential hypertension     Hyperlipidemia LDL goal <130     Situational anxiety     Past Surgical History:   Procedure Laterality Date     NO HISTORY OF SURGERY         Social History     Tobacco Use     Smoking status: Never Smoker     Smokeless tobacco: Never Used   Substance Use Topics     Alcohol use: Yes     Frequency: Never     Comment: OCC     Family History   Problem Relation Age of Onset     Cerebrovascular Disease Father      Coronary Artery Disease Father         around 51 yo      Diabetes Father          Current Outpatient Medications   Medication Sig Dispense Refill     lisinopril-hydrochlorothiazide (ZESTORETIC) 20-12.5 MG tablet Take 1 tablet by mouth daily 90 tablet 3     No Known Allergies    Reviewed and updated as needed this visit by Provider  Tobacco  Allergies  Meds  Problems  Med Hx  Surg Hx  Fam Hx         Review of Systems   Constitutional, HEENT, cardiovascular, pulmonary, gi and gu systems are negative, except as otherwise noted.       Objective   Reported vitals:  There were no vitals taken for this visit.   healthy, alert and no distress  PSYCH: Alert and oriented times 3; coherent speech, normal   rate and volume, able to articulate logical thoughts, able   to abstract reason, no tangential thoughts, no hallucinations   or delusions  His affect is normal  RESP: No cough, no audible wheezing, able to talk in full sentences  Remainder of exam unable to be completed due to telephone visits    Diagnostic Test Results:  Labs reviewed in Epic    Assessment/Plan:  1.  Benign essential hypertension  Continue on current dose. BP well-controlled per his home monitoring. Refill sent.  - lisinopril-hydrochlorothiazide (ZESTORETIC) 20-12.5 MG tablet; Take 1 tablet by mouth daily  Dispense: 90 tablet; Refill: 3    2. Other headache syndrome  Seems to be benign as it results/resolves when he misses/restarts his Zestoretic. Low suspicion for SAH or other end organ damage.    Complete history and physical exam as above.     DDx and Dx discussed with and explained to the pt to their satisfaction.  All questions were answered at this time. Pt expressed understanding of and agreement with this dx, tx, and plan. No further workup warranted and standard medication warnings given. I have given the patient a list of pertinent indications for re-evaluation. Will go to the Emergency Department if symptoms worsen or new concerning symptoms arise.     Return in about 1 year (around 5/14/2021).      Phone call duration:  5 minutes    ELLIOT Singleton

## 2021-06-24 DIAGNOSIS — I10 BENIGN ESSENTIAL HYPERTENSION: ICD-10-CM

## 2021-06-24 NOTE — LETTER
June 30, 2021      Carlos Alberto Salazar  [address]  [place] [address]            Your provider has sent a 30 day shay refill of lisinopril-hydrochlorothiazide (ZESTORETIC) 20-12.5 MG tablet. You are due for an appointment for further refills. Please contact the clinic to schedule an appointment for further refills.      Sincerely,       [facility] System[facility]eraj TORRES

## 2021-06-24 NOTE — TELEPHONE ENCOUNTER
Patient is requesting a refill on Lisinopril-hydrochlorothiazide 20-12.5 mg tablet.    Sunitha Burnham [facility] [place]

## 2021-06-25 RX ORDER — LISINOPRIL AND HYDROCHLOROTHIAZIDE 12.5; 2 MG/1; MG/1
1 TABLET ORAL DAILY
Qty: 30 TABLET | Refills: 0 | Status: SHIPPED | OUTPATIENT
Start: 2021-06-25 | End: 2021-07-26

## 2021-06-25 NOTE — TELEPHONE ENCOUNTER
Please assist patient with scheduling follow up. Appointment needed for further refills.    Neeru Gaitan RN

## 2021-06-25 NOTE — TELEPHONE ENCOUNTER
Last Written Prescription Date:  5/14/20  Last Fill Quantity: 90,  # refills: 3   Last office visit: 5/1/2019 with prescribing provider:  Tricia Capps PA-C   VIRTUAL visit 5/14/20 with Chace Loco  Future Office Visit: none    Routing refill request to provider for review/approval because:  Failed protocol    Kasey Mena, RN, BSN  [facility]

## 2021-07-26 ENCOUNTER — OFFICE VISIT (OUTPATIENT)
Dept: FAMILY MEDICINE | Facility: CLINIC | Age: 41
End: 2021-07-26
Payer: COMMERCIAL

## 2021-07-26 VITALS
RESPIRATION RATE: 14 BRPM | WEIGHT: 193.4 LBS | HEART RATE: 82 BPM | SYSTOLIC BLOOD PRESSURE: 106 MMHG | DIASTOLIC BLOOD PRESSURE: 74 MMHG | TEMPERATURE: 98.1 F | OXYGEN SATURATION: 95 % | BODY MASS INDEX: 28.64 KG/M2 | HEIGHT: 69 IN

## 2021-07-26 DIAGNOSIS — N52.9 ERECTILE DYSFUNCTION, UNSPECIFIED ERECTILE DYSFUNCTION TYPE: ICD-10-CM

## 2021-07-26 DIAGNOSIS — I10 BENIGN ESSENTIAL HYPERTENSION: Primary | ICD-10-CM

## 2021-07-26 PROCEDURE — 36415 COLL VENOUS BLD VENIPUNCTURE: CPT | Performed by: PHYSICIAN ASSISTANT

## 2021-07-26 PROCEDURE — 80048 BASIC METABOLIC PNL TOTAL CA: CPT | Performed by: PHYSICIAN ASSISTANT

## 2021-07-26 PROCEDURE — 99213 OFFICE O/P EST LOW 20 MIN: CPT | Performed by: PHYSICIAN ASSISTANT

## 2021-07-26 RX ORDER — SILDENAFIL CITRATE 20 MG/1
TABLET ORAL
Qty: 20 TABLET | Refills: 0 | Status: SHIPPED | OUTPATIENT
Start: 2021-07-26 | End: 2022-02-18

## 2021-07-26 RX ORDER — LOSARTAN POTASSIUM AND HYDROCHLOROTHIAZIDE 12.5; 5 MG/1; MG/1
1 TABLET ORAL DAILY
Qty: 90 TABLET | Refills: 0 | Status: SHIPPED | OUTPATIENT
Start: 2021-07-26 | End: 2021-11-02

## 2021-07-26 ASSESSMENT — MIFFLIN-ST. JEOR: SCORE: 1771.02

## 2021-07-26 NOTE — LETTER
[facility] [place]  [address] [place] [address]  [phone]        July 27, 2021      Carlos Alberto Salazar  [address] [place] [address]        Dear ,    We are writing to inform you of your test results.    Your test results fall within the expected range(s) or remain unchanged from previous results.  Please continue with current treatment plan.    Resulted Orders   Basic metabolic panel  (Ca, Cl, CO2, Creat, Gluc, K, Na, BUN)   Result Value Ref Range    Sodium 137 133 - 144 mmol/L    Potassium 3.9 3.4 - 5.3 mmol/L    Chloride 106 94 - 109 mmol/L    Carbon Dioxide (CO2) 24 20 - 32 mmol/L    Anion Gap 7 3 - 14 mmol/L    Urea Nitrogen 23 7 - 30 mg/dL    Creatinine 1.02 0.66 - 1.25 mg/dL    Calcium 9.7 8.5 - 10.1 mg/dL    Glucose 96 70 - 99 mg/dL    GFR Estimate >90 >60 mL/min/1.73m2      Comment:      As of July 11, 2021, eGFR is calculated by the CKD-EPI creatinine equation, without race adjustment. eGFR can be influenced by muscle mass, exercise, and diet. The reported eGFR is an estimation only and is only applicable if the renal function is stable.       If you have any questions or concerns, please call the clinic at the number listed above.       Sincerely,      ELLIOT Singleton/seniao

## 2021-07-26 NOTE — PATIENT INSTRUCTIONS
Lili Carcamo,    Thank you for allowing [facility] to manage your care.    I ordered some lab work, please go to the laboratory to get your studies.    I sent your prescriptions to your pharmacy.    Please allow 1-2 business days for our office to contact you in regards to your laboratory/radiological studies.  If not done so, I encourage you to login into [employer] ([email or web address] to review your results as well.     If you have any questions or concerns, please feel free to call us at [phone]    Sincerely,    Evan Loco PA-C    Did you know?      You can schedule a video visit for follow-up appointments as well as future appointments for certain conditions.  Please see the below link.     [email or web address]    If you have not already done so,  I encourage you to sign up for [employer] ([email or web address]).  This will allow you to review your results, securely communicate with a provider, and schedule virtual visits as well.      Patient Education     Oral Medicines for Erectile Dysfunction  You can use prescription oral medicine for erectile dysfunction (ED). They often work well. But, like all medicines, they can have side effects. Also, you can t use them if you have certain health problems or take certain other medicines. Talk with your healthcare provider about oral ED medicine. Ask whether it is right for you.  Types of oral ED medicines  Your healthcare provide may suggest 1 of 4 different prescription oral ED medicines. Each one increases blood flow to the penis. When the penis is stimulated, an erection results. The medicines are:    Sildenafil citrate     Tadalafil     Vardenafil    Avanfil  What oral ED medicines don t do  There are some things ED medicines can t do.    They don t cure the cause of your ED. Without the medicine, you ll still have trouble getting an erection.    They can t produce an erection without  sexual stimulation.    They won t increase sexual desire. They also won t solve any other sexual issues. Psychological, emotional, or relationship issues will not be fixed.  Taking oral ED medicines safely    Do not combine ED medicines with other treatments unless your healthcare provider tells you to.    Don t take ED medicines more often or in larger doses than prescribed.    Tell your healthcare provider about your health history. Mention all medicines you take. This includes over-the-counter drugs, supplements, and herbs.    Ask your healthcare provider about whether you can drink alcohol while taking ED medication.    Check with your pharmacist before using any new over-the-counter medicines to make sure that they are safe to use with your ED medicine.    Possible side effects of oral ED medicines    Headache    Facial flushing    Runny or stuffy nose    Indigestion    Distortion of your color vision for a short time    Dizziness    Sudden vision or hearing loss (rare)    Erection that lasts 4 hours or longer    Risks of oral ED medicines    Taking ED medicines with medicines that contain nitrates can cause your blood pressure to drop to a dangerous level. . Nitrates include nitroglycerin (a drug for angina or chest pain). They are also in  poppers,  an inhaled recreational drug. Do not take ED medicines if you take medicines containing nitrates. If you re not sure whether you re taking nitrates, ask your healthcare provider or pharmacist.    Medicines called alpha-blockers can interact with ED medicines. They can cause a sudden drop in blood pressure. Alpha-blockers are a common treatment for prostate problems. They are also used to treat high blood pressure. Be sure your healthcare provider knows if you take these medicines.    If you ve had a heart attack or have heart disease and you have not had sex for a while, having sex again can put extra strain on your heart. Talk with your healthcare provider about  whether your heart is healthy enough for sex.    It is rare, but some men taking ED medicines have had sudden vision loss. This may be more likely if other health problems are present. These include high blood pressure and diabetes. Ask your healthcare provider if you are at risk for this type of vision loss.    In rare cases, an erection may last too long. This can badly damage the blood vessels in your penis. If an erection lasts longer than 4 hours, go to the emergency room right away.    [employer] last reviewed this educational content on 6/1/2019 2000-2021 [employer]. All rights reserved. This information is not intended as a substitute for professional medical care. Always follow your healthcare professional's instructions.

## 2021-07-26 NOTE — PROGRESS NOTES
Assessment & Plan   Problem List Items Addressed This Visit        Circulatory    Benign essential hypertension - Primary    Relevant Medications    losartan-hydrochlorothiazide (HYZAAR) 50-12.5 MG tablet    Other Relevant Orders    Basic metabolic panel  (Ca, Cl, CO2, Creat, Gluc, K, Na, BUN) (Completed)      Other Visit Diagnoses     Erectile dysfunction, unspecified erectile dysfunction type        Relevant Medications    sildenafil (REVATIO) 20 MG tablet         Will switch to losartan/HCTZ to see if cough resolves. He will check Bps daily x 2 weeks. Will trial sildenafil. Follow up with us in 1 month, sooner as needed.    Complete history and physical exam as below. AF with normal VS except for elevated bp, which they will monitor at home and contact us if >140/90mmHg greater than 50% of the time.    DDx and Dx discussed with and explained to the pt to their satisfaction.  All questions were answered at this time. Pt expressed understanding of and agreement with this dx, tx, and plan. No further workup warranted and standard medication warnings given. I have given the patient a list of pertinent indications for re-evaluation. Will go to the Emergency Department if symptoms worsen or new concerning symptoms arise. Patient left in no apparent distress.     20 minutes spent on the date of the encounter doing chart review, history and exam, documentation and further activities per the note     See Patient Instructions    Return in about 1 month (around 8/26/2021) for a recheck as needed, or call 911/go to an ER anytime if worsening.    ELLIOT Singleton  [facility] VALARIE Carcamo is a 41 year old who presents for the following health issues  accompanied by his spouse:    HPI   Patient has has a mild chronic AM cough that he feels may be do to his lisinopril. No other side effects.  Hypertension Follow-up      Do you check your blood pressure regularly outside of the clinic? Yes  "    Are you following a low salt diet? No    Are your blood pressures ever more than 140 on the top number (systolic) OR more   than 90 on the bottom number (diastolic), for example 140/90? Patient doesn't check blood pressure outside the clinic      How many servings of fruits and vegetables do you eat daily?  0-1    On average, how many sweetened beverages do you drink each day (Examples: soda, juice, sweet tea, etc.  Do NOT count diet or artificially sweetened beverages)?   2    How many days per week do you exercise enough to make your heart beat faster? Active at work. No formal exercise    How many minutes a day do you exercise enough to make your heart beat faster? 9 or less    How many days per week do you miss taking your medication? 0    Discuss ED. This has been an issue for the last few years. No change in am erections as he has never really had them.     Review of Systems   Constitutional, HEENT, cardiovascular, pulmonary, gi and gu systems are negative, except as otherwise noted.      Objective    /74   Pulse 82   Temp 98.1  F (36.7  C) (Tympanic)   Resp 14   Ht 1.75 m (5' 8.9\")   Wt 87.7 kg (193 lb 6.4 oz)   SpO2 95%   BMI 28.64 kg/m    Body mass index is 28.64 kg/m .  Physical Exam  Vitals and nursing note reviewed.   Constitutional:       General: He is not in acute distress.     Appearance: He is not ill-appearing or diaphoretic.   HENT:      Head: Normocephalic and atraumatic.      Mouth/Throat:      Mouth: Mucous membranes are moist.   Eyes:      Conjunctiva/sclera: Conjunctivae normal.   Cardiovascular:      Rate and Rhythm: Normal rate and regular rhythm.      Heart sounds: Normal heart sounds. No murmur heard.   No friction rub. No gallop.    Pulmonary:      Effort: Pulmonary effort is normal. No respiratory distress.      Breath sounds: Normal breath sounds. No stridor. No wheezing, rhonchi or rales.   Skin:     General: Skin is warm and dry.   Neurological:      General: No " focal deficit present.      Mental Status: He is alert. Mental status is at baseline.   Psychiatric:         Mood and Affect: Mood normal.         Behavior: Behavior normal.          Results for orders placed or performed in visit on 07/26/21   Basic metabolic panel  (Ca, Cl, CO2, Creat, Gluc, K, Na, BUN)     Status: Normal   Result Value Ref Range    Sodium 137 133 - 144 mmol/L    Potassium 3.9 3.4 - 5.3 mmol/L    Chloride 106 94 - 109 mmol/L    Carbon Dioxide (CO2) 24 20 - 32 mmol/L    Anion Gap 7 3 - 14 mmol/L    Urea Nitrogen 23 7 - 30 mg/dL    Creatinine 1.02 0.66 - 1.25 mg/dL    Calcium 9.7 8.5 - 10.1 mg/dL    Glucose 96 70 - 99 mg/dL    GFR Estimate >90 >60 mL/min/1.73m2

## 2021-07-27 LAB
ANION GAP SERPL CALCULATED.3IONS-SCNC: 7 MMOL/L (ref 3–14)
BUN SERPL-MCNC: 23 MG/DL (ref 7–30)
CALCIUM SERPL-MCNC: 9.7 MG/DL (ref 8.5–10.1)
CHLORIDE BLD-SCNC: 106 MMOL/L (ref 94–109)
CO2 SERPL-SCNC: 24 MMOL/L (ref 20–32)
CREAT SERPL-MCNC: 1.02 MG/DL (ref 0.66–1.25)
GFR SERPL CREATININE-BSD FRML MDRD: >90 ML/MIN/1.73M2
GLUCOSE BLD-MCNC: 96 MG/DL (ref 70–99)
POTASSIUM BLD-SCNC: 3.9 MMOL/L (ref 3.4–5.3)
SODIUM SERPL-SCNC: 137 MMOL/L (ref 133–144)

## 2021-07-28 ENCOUNTER — TELEPHONE (OUTPATIENT)
Dept: FAMILY MEDICINE | Facility: CLINIC | Age: 41
End: 2021-07-28

## 2021-07-28 NOTE — TELEPHONE ENCOUNTER
"Pt has a state plan but the reject does NOT say \"plan exlcusion\"    Prior Authorization Retail Medication Request    Medication/Dose: Sildenafil 20mg  ICD code (if different than what is on RX):    Previously Tried and Failed:    Rationale:      Insurance Name:  Medicaid [employer] [facility]  Insurance ID:  [id number]      [facility] [address] [place] [address]    Phone: [phone]    Fax: [phone]        "

## 2021-07-28 NOTE — TELEPHONE ENCOUNTER
PA Initiation    Medication: Sildenafil 20mg- INITIATED  Insurance Company: [employer] - Phone [phone] Fax [phone]  Pharmacy Filling the Rx: [facility] [place] [place] [address] [facility] [place] [place] Pharmacy Phone: [phone]  Filling Pharmacy Fax: [phone]  Start Date: 7/28/2021

## 2021-07-29 NOTE — TELEPHONE ENCOUNTER
PRIOR AUTHORIZATION DENIED    Medication: Sildenafil 20mg- DENIED    Denial Date: 7/29/2021    Denial Rational: NOT COVERED           Appeal Information:         Central Prior Authorization Team  Phone: [phone]

## 2021-11-01 DIAGNOSIS — I10 BENIGN ESSENTIAL HYPERTENSION: ICD-10-CM

## 2021-11-02 RX ORDER — LOSARTAN POTASSIUM AND HYDROCHLOROTHIAZIDE 12.5; 5 MG/1; MG/1
1 TABLET ORAL DAILY
Qty: 90 TABLET | Refills: 0 | Status: SHIPPED | OUTPATIENT
Start: 2021-11-02 | End: 2022-02-02

## 2021-11-02 NOTE — TELEPHONE ENCOUNTER
Routing refill request to provider for review/approval because:  Patient needs to be seen because:    +++NEED ANNUAL EXAM+++

## 2022-01-27 DIAGNOSIS — I10 BENIGN ESSENTIAL HYPERTENSION: ICD-10-CM

## 2022-01-28 NOTE — TELEPHONE ENCOUNTER
Patient only seen by a same day provider in the last year.  Needs to be seen to Establish Care with a non same day provider (rob Loco or Jordana Gunderson.)  Will have [facility] Reception call patient to schedule.   Once appointment is made send refill back to [facility] RN pool and we can see if we can get a shay refill until appointment.

## 2022-02-02 RX ORDER — LOSARTAN POTASSIUM AND HYDROCHLOROTHIAZIDE 12.5; 5 MG/1; MG/1
TABLET ORAL
Qty: 30 TABLET | Refills: 0 | Status: SHIPPED | OUTPATIENT
Start: 2022-02-02 | End: 2022-02-18

## 2022-02-02 NOTE — TELEPHONE ENCOUNTER
Pharmacy ([facility]) confirmed that patient picked up last 90 day supply on 11/5/21.     Provided a 30 day supply to last until appointment later this month.     Prescription approved per [facility] Refill Protocol.      Gerri Santana RN BSN  [facility]

## 2022-02-02 NOTE — TELEPHONE ENCOUNTER
Next Appt  With Family Practice (Crystal Catalan PA-C)  02/18/2022 at 7:00 AM    Sunitha Arce on 2/2/2022 at 12:12 PM

## 2022-02-18 ENCOUNTER — OFFICE VISIT (OUTPATIENT)
Dept: FAMILY MEDICINE | Facility: CLINIC | Age: 42
End: 2022-02-18
Payer: COMMERCIAL

## 2022-02-18 VITALS
HEIGHT: 69 IN | TEMPERATURE: 97.3 F | BODY MASS INDEX: 29.62 KG/M2 | SYSTOLIC BLOOD PRESSURE: 124 MMHG | WEIGHT: 200 LBS | HEART RATE: 71 BPM | DIASTOLIC BLOOD PRESSURE: 83 MMHG

## 2022-02-18 DIAGNOSIS — N52.9 ERECTILE DYSFUNCTION, UNSPECIFIED ERECTILE DYSFUNCTION TYPE: ICD-10-CM

## 2022-02-18 DIAGNOSIS — F33.1 MODERATE EPISODE OF RECURRENT MAJOR DEPRESSIVE DISORDER (H): ICD-10-CM

## 2022-02-18 DIAGNOSIS — Z30.2 ENCOUNTER FOR STERILIZATION: ICD-10-CM

## 2022-02-18 DIAGNOSIS — F41.9 ANXIETY: Primary | ICD-10-CM

## 2022-02-18 DIAGNOSIS — K40.90 NON-RECURRENT UNILATERAL INGUINAL HERNIA WITHOUT OBSTRUCTION OR GANGRENE: ICD-10-CM

## 2022-02-18 DIAGNOSIS — I10 BENIGN ESSENTIAL HYPERTENSION: ICD-10-CM

## 2022-02-18 PROCEDURE — 96127 BRIEF EMOTIONAL/BEHAV ASSMT: CPT | Mod: 59 | Performed by: PHYSICIAN ASSISTANT

## 2022-02-18 PROCEDURE — 99214 OFFICE O/P EST MOD 30 MIN: CPT | Performed by: PHYSICIAN ASSISTANT

## 2022-02-18 RX ORDER — SILDENAFIL CITRATE 20 MG/1
TABLET ORAL
Qty: 20 TABLET | Refills: 3 | Status: SHIPPED | OUTPATIENT
Start: 2022-02-18 | End: 2023-01-26

## 2022-02-18 RX ORDER — LOSARTAN POTASSIUM AND HYDROCHLOROTHIAZIDE 12.5; 5 MG/1; MG/1
TABLET ORAL
Qty: 90 TABLET | Refills: 1 | Status: SHIPPED | OUTPATIENT
Start: 2022-02-18 | End: 2022-09-26

## 2022-02-18 RX ORDER — TRAZODONE HYDROCHLORIDE 50 MG/1
50 TABLET, FILM COATED ORAL AT BEDTIME
Qty: 30 TABLET | Refills: 1 | Status: SHIPPED | OUTPATIENT
Start: 2022-02-18 | End: 2022-03-04 | Stop reason: SINTOL

## 2022-02-18 ASSESSMENT — ANXIETY QUESTIONNAIRES
7. FEELING AFRAID AS IF SOMETHING AWFUL MIGHT HAPPEN: NOT AT ALL
5. BEING SO RESTLESS THAT IT IS HARD TO SIT STILL: NOT AT ALL
GAD7 TOTAL SCORE: 6
2. NOT BEING ABLE TO STOP OR CONTROL WORRYING: SEVERAL DAYS
GAD7 TOTAL SCORE: 6
GAD7 TOTAL SCORE: 6
6. BECOMING EASILY ANNOYED OR IRRITABLE: NEARLY EVERY DAY
3. WORRYING TOO MUCH ABOUT DIFFERENT THINGS: SEVERAL DAYS
1. FEELING NERVOUS, ANXIOUS, OR ON EDGE: SEVERAL DAYS
7. FEELING AFRAID AS IF SOMETHING AWFUL MIGHT HAPPEN: NOT AT ALL
4. TROUBLE RELAXING: NOT AT ALL

## 2022-02-18 ASSESSMENT — COLUMBIA-SUICIDE SEVERITY RATING SCALE - C-SSRS
1. WITHIN THE PAST MONTH, HAVE YOU WISHED YOU WERE DEAD OR WISHED YOU COULD GO TO SLEEP AND NOT WAKE UP?: NO
2. IN THE PAST MONTH, HAVE YOU ACTUALLY HAD ANY THOUGHTS OF KILLING YOURSELF?: NO
6. HAVE YOU EVER DONE ANYTHING, STARTED TO DO ANYTHING, OR PREPARED TO DO ANYTHING TO END YOUR LIFE?: NO

## 2022-02-18 ASSESSMENT — PATIENT HEALTH QUESTIONNAIRE - PHQ9
SUM OF ALL RESPONSES TO PHQ QUESTIONS 1-9: 9
SUM OF ALL RESPONSES TO PHQ QUESTIONS 1-9: 9
10. IF YOU CHECKED OFF ANY PROBLEMS, HOW DIFFICULT HAVE THESE PROBLEMS MADE IT FOR YOU TO DO YOUR WORK, TAKE CARE OF THINGS AT HOME, OR GET ALONG WITH OTHER PEOPLE: SOMEWHAT DIFFICULT

## 2022-02-18 NOTE — PROGRESS NOTES
"  Assessment & Plan     Erectile dysfunction, unspecified erectile dysfunction type  refilled  - sildenafil (REVATIO) 20 MG tablet; Take 1-2 tablets by mouth one hour before sexual activity.    Benign essential hypertension  At goal.  No changes  - losartan-hydrochlorothiazide (HYZAAR) 50-12.5 MG tablet; TAKE 1 TABLET BY MOUTH DAILY    Anxiety  Start with trazodone to help with sleep. Consider selective serotonin reuptake inhibitor at next office visit if needed  - traZODone (DESYREL) 50 MG tablet; Take 1 tablet (50 mg) by mouth At Bedtime    Moderate episode of recurrent major depressive disorder (H)  As above  - traZODone (DESYREL) 50 MG tablet; Take 1 tablet (50 mg) by mouth At Bedtime    Non-recurrent unilateral inguinal hernia without obstruction or gangrene    - Adult General Surg Referral; Future    Encounter for sterilization    - Adult Urology Referral; Future             BMI:   Estimated body mass index is 29.32 kg/m  as calculated from the following:    Height as of this encounter: 1.759 m (5' 9.25\").    Weight as of this encounter: 90.7 kg (200 lb).   Weight management plan: not addressed    Depression Screening Follow Up    PHQ 2/18/2022   PHQ-9 Total Score 9   Q9: Thoughts of better off dead/self-harm past 2 weeks Several days   F/U: Thoughts of suicide or self-harm No   F/U: Safety concerns No           C-SSRS (Brief [place]) 2/18/2022   Within the last month, have you wished you were dead or wished you could go to sleep and not wake up? No   Within the last month, have you had any actual thoughts of killing yourself? No   Within the last month, have you ever done anything, started to do anything, or prepared to do anything to end your life? No       Follow Up        Follow Up Actions Taken  pt has good support and follow up     Discussed the following ways the patient can remain in a safe environment:  be around others      Return in about 2 weeks (around 3/4/2022) for mood-virtual visit is fine " .    Crystal Catalan PA-C  [facility] PEE Carcamo is a 41 year old who presents for the following health issues  accompanied by his spouse.    History of Present Illness       Mental Health Follow-up:  Patient presents to follow-up on Depression & Anxiety.Patient's depression since last visit has been:  No change  The patient is not having other symptoms associated with depression.  Patient's anxiety since last visit has been:  No change  The patient is not having other symptoms associated with anxiety.  Any significant life events: No  Patient is feeling anxious or having panic attacks.  Patient has no concerns about alcohol or drug use.     Social History  Tobacco Use    Smoking status: Never Smoker    Smokeless tobacco: Never Used  Vaping Use    Vaping Use: Never used  Alcohol use: Yes    Comment: OCC  Drug use: No      Today's PHQ-9         PHQ-9 Total Score:     (P) 9   PHQ-9 Q9 Thoughts of better off dead/self-harm past 2 weeks :   (P) Several days   Thoughts of suicide or self harm:  (P) No   Self-harm Plan:        Self-harm Action:          Safety concerns for self or others: (P) No         Hypertension: He presents for follow up of hypertension.  He does check blood pressure  regularly outside of the clinic. Outpatient blood pressures have not been over 140/90. He does not follow a low salt diet.     He eats 0-1 servings of fruits and vegetables daily.He consumes 1 sweetened beverage(s) daily.He exercises with enough effort to increase his heart rate 60 or more minutes per day.  He exercises with enough effort to increase his heart rate 5 days per week. He is missing 1 dose(s) of medications per week.  He is not taking prescribed medications regularly due to remembering to take.       Hypertension Follow-up      Do you check your blood pressure regularly outside of the clinic? sometimes    Are you following a low salt diet? No    Are your blood pressures ever more  "than 140 on the top number (systolic) OR more   than 90 on the bottom number (diastolic), for example 140/90? No    Hernia and vasectomy referral   Has had anxiety for several years.  Worse since parents passed away.  Worse if he doesn't sleep well.  Comes and goes.  Bad dreams at night keep him awake and then his anxiety is worse.     Was on medication as a teenager.  Steffany did therapy.   Sometimes has panic attacks.  Lots of worry.       Has a hernia in groin.  Starting to get bigger and more uncomfortable.  Has had it for several years.              Review of Systems   As above      Objective    /83   Pulse 71   Temp 97.3  F (36.3  C) (Oral)   Ht 1.759 m (5' 9.25\")   Wt 90.7 kg (200 lb)   BMI 29.32 kg/m    Body mass index is 29.32 kg/m .  Physical Exam  Constitutional:       General: He is not in acute distress.  Cardiovascular:      Rate and Rhythm: Normal rate and regular rhythm.   Pulmonary:      Effort: Pulmonary effort is normal.      Breath sounds: Normal breath sounds.   Abdominal:      Tenderness: There is no abdominal tenderness.      Hernia: A hernia is present. Hernia is present in the right inguinal area.   Neurological:      Mental Status: He is alert.   Psychiatric:         Mood and Affect: Mood normal.                        Answers for HPI/ROS submitted by the patient on 2/18/2022  If you checked off any problems, how difficult have these problems made it for you to do your work, take care of things at home, or get along with other people?: Somewhat difficult  PHQ9 TOTAL SCORE: 9  BENNIE 7 TOTAL SCORE: 6      "

## 2022-02-18 NOTE — PROGRESS NOTES
{PROVIDER CHARTING PREFERENCE:599383}    Bulmaro Carcamo is a 41 year old who presents for the following health issues {ACCOMPANIED BY STATEMENT (Optional):785124}    History of Present Illness       Mental Health Follow-up:  Patient presents to follow-up on Depression & Anxiety.Patient's depression since last visit has been:  No change  The patient is not having other symptoms associated with depression.  Patient's anxiety since last visit has been:  No change  The patient is not having other symptoms associated with anxiety.  Any significant life events: No  Patient is feeling anxious or having panic attacks.  Patient has no concerns about alcohol or drug use.     Social History  Tobacco Use    Smoking status: Never Smoker    Smokeless tobacco: Never Used  Vaping Use    Vaping Use: Never used  Alcohol use: Yes    Comment: OCC  Drug use: No      Today's PHQ-9         PHQ-9 Total Score:     (P) 9   PHQ-9 Q9 Thoughts of better off dead/self-harm past 2 weeks :   (P) Several days   Thoughts of suicide or self harm:  (P) No   Self-harm Plan:        Self-harm Action:          Safety concerns for self or others: (P) No         Hypertension: He presents for follow up of hypertension.  He does check blood pressure  regularly outside of the clinic. Outpatient blood pressures have not been over 140/90. He does not follow a low salt diet.     He eats 0-1 servings of fruits and vegetables daily.He consumes 1 sweetened beverage(s) daily.He exercises with enough effort to increase his heart rate 60 or more minutes per day.  He exercises with enough effort to increase his heart rate 5 days per week. He is missing 1 dose(s) of medications per week.  He is not taking prescribed medications regularly due to remembering to take.     {ALERT  Recent PHQ-9 score indicates suicidal ideations :357261}  {SUPERLIST (Optional):413855}  {additonal problems for provider to add (Optional):679516}    Review of Systems   {ROS COMP  (Optional):914072}      Objective    There were no vitals taken for this visit.  There is no height or weight on file to calculate BMI.  Physical Exam   {Exam List (Optional):378344}    {Diagnostic Test Results (Optional):628423}    {AMBULATORY ATTESTATION (Optional):245804}        Answers for HPI/ROS submitted by the patient on 2/18/2022  If you checked off any problems, how difficult have these problems made it for you to do your work, take care of things at home, or get along with other people?: Somewhat difficult  PHQ9 TOTAL SCORE: 9  BENNIE 7 TOTAL SCORE: 6

## 2022-02-18 NOTE — PROGRESS NOTES
{PROVIDER CHARTING PREFERENCE:219253}    Bulmaro Carcamo is a 41 year old who presents for the following health issues {ACCOMPANIED BY STATEMENT (Optional):324242}    HPI   {ALERT  Recent PHQ-9 score indicates suicidal ideations :307016}{Provider Documentation  Link to C-SRSS (Brief [place]) Flowsheet :778796}  {SUPERLIST (Optional):506475}  {additonal problems for provider to add (Optional):997426}    Review of Systems   {ROS COMP (Optional):979000}      Objective    There were no vitals taken for this visit.  There is no height or weight on file to calculate BMI.  Physical Exam   {Exam List (Optional):085492}    {Diagnostic Test Results (Optional):356261}    {AMBULATORY ATTESTATION (Optional):783171}

## 2022-02-19 ASSESSMENT — ANXIETY QUESTIONNAIRES: GAD7 TOTAL SCORE: 6

## 2022-02-19 ASSESSMENT — PATIENT HEALTH QUESTIONNAIRE - PHQ9: SUM OF ALL RESPONSES TO PHQ QUESTIONS 1-9: 9

## 2022-03-04 ENCOUNTER — VIRTUAL VISIT (OUTPATIENT)
Dept: FAMILY MEDICINE | Facility: CLINIC | Age: 42
End: 2022-03-04
Payer: COMMERCIAL

## 2022-03-04 DIAGNOSIS — F41.9 ANXIETY: ICD-10-CM

## 2022-03-04 DIAGNOSIS — F33.1 MODERATE EPISODE OF RECURRENT MAJOR DEPRESSIVE DISORDER (H): Primary | ICD-10-CM

## 2022-03-04 PROCEDURE — 96127 BRIEF EMOTIONAL/BEHAV ASSMT: CPT | Performed by: PHYSICIAN ASSISTANT

## 2022-03-04 PROCEDURE — 99214 OFFICE O/P EST MOD 30 MIN: CPT | Mod: TEL | Performed by: PHYSICIAN ASSISTANT

## 2022-03-04 RX ORDER — SERTRALINE HYDROCHLORIDE 25 MG/1
25 TABLET, FILM COATED ORAL DAILY
Qty: 30 TABLET | Refills: 1 | Status: SHIPPED | OUTPATIENT
Start: 2022-03-04 | End: 2022-06-29

## 2022-03-04 RX ORDER — HYDROXYZINE HYDROCHLORIDE 25 MG/1
25 TABLET, FILM COATED ORAL 3 TIMES DAILY PRN
Qty: 30 TABLET | Refills: 1 | Status: SHIPPED | OUTPATIENT
Start: 2022-03-04 | End: 2022-06-29

## 2022-03-04 ASSESSMENT — ANXIETY QUESTIONNAIRES
6. BECOMING EASILY ANNOYED OR IRRITABLE: SEVERAL DAYS
GAD7 TOTAL SCORE: 14
7. FEELING AFRAID AS IF SOMETHING AWFUL MIGHT HAPPEN: NEARLY EVERY DAY
7. FEELING AFRAID AS IF SOMETHING AWFUL MIGHT HAPPEN: NEARLY EVERY DAY
4. TROUBLE RELAXING: MORE THAN HALF THE DAYS
GAD7 TOTAL SCORE: 14
2. NOT BEING ABLE TO STOP OR CONTROL WORRYING: NEARLY EVERY DAY
3. WORRYING TOO MUCH ABOUT DIFFERENT THINGS: MORE THAN HALF THE DAYS
5. BEING SO RESTLESS THAT IT IS HARD TO SIT STILL: NOT AT ALL
1. FEELING NERVOUS, ANXIOUS, OR ON EDGE: NEARLY EVERY DAY
GAD7 TOTAL SCORE: 14

## 2022-03-04 ASSESSMENT — PATIENT HEALTH QUESTIONNAIRE - PHQ9
SUM OF ALL RESPONSES TO PHQ QUESTIONS 1-9: 18
10. IF YOU CHECKED OFF ANY PROBLEMS, HOW DIFFICULT HAVE THESE PROBLEMS MADE IT FOR YOU TO DO YOUR WORK, TAKE CARE OF THINGS AT HOME, OR GET ALONG WITH OTHER PEOPLE: VERY DIFFICULT
SUM OF ALL RESPONSES TO PHQ QUESTIONS 1-9: 18

## 2022-03-04 NOTE — PROGRESS NOTES
Carlos Alberto is a 41 year old who is being evaluated via a billable telephone visit.    7:52-8:01  What phone number would you like to be contacted at? [phone]   How would you like to obtain your AVS? MyChart    Assessment & Plan     Moderate episode of recurrent major depressive disorder (H)  Will stop trazodone due to side effects and start zoloft and add hydroxyzine as needed for anxiety  - sertraline (ZOLOFT) 25 MG tablet; Take 1 tablet (25 mg) by mouth daily  - hydrOXYzine (ATARAX) 25 MG tablet; Take 1 tablet (25 mg) by mouth 3 times daily as needed for anxiety    Anxiety  As above  - sertraline (ZOLOFT) 25 MG tablet; Take 1 tablet (25 mg) by mouth daily  - hydrOXYzine (ATARAX) 25 MG tablet; Take 1 tablet (25 mg) by mouth 3 times daily as needed for anxiety             Depression Screening Follow Up    PHQ 3/4/2022   PHQ-9 Total Score 18   Q9: Thoughts of better off dead/self-harm past 2 weeks Several days   F/U: Thoughts of suicide or self-harm No   F/U: Safety concerns No                 Follow Up    Follow Up Actions Taken  follow up in 2 weeks    Discussed the following ways the patient can remain in a safe environment:  be around others      Return in about 2 weeks (around 3/18/2022) for mood.    Crystal Catalan PA-C  [facility]    Bulmaro Carcamo is a 41 year old who presents for the following health issues  accompanied by his self .    History of Present Illness       Mental Health Follow-up:  Patient presents to follow-up on Depression & Anxiety.Patient's depression since last visit has been:  No change  The patient is not having other symptoms associated with depression.  Patient's anxiety since last visit has been:  No change  The patient is not having other symptoms associated with anxiety.  Any significant life events: grief or loss  Patient is feeling anxious or having panic attacks.  Patient has no concerns about alcohol or drug use.     Social History  Tobacco Use     Smoking status: Never Smoker    Smokeless tobacco: Never Used  Vaping Use    Vaping Use: Never used  Alcohol use: Yes    Comment: OCC  Drug use: No      Today's PHQ-9         PHQ-9 Total Score:     (P) 18   PHQ-9 Q9 Thoughts of better off dead/self-harm past 2 weeks :   (P) Several days   Thoughts of suicide or self harm:  (P) No   Self-harm Plan:        Self-harm Action:          Safety concerns for self or others: (P) No         He eats 0-1 servings of fruits and vegetables daily.He consumes 1 sweetened beverage(s) daily.He exercises with enough effort to increase his heart rate 60 or more minutes per day.  He exercises with enough effort to increase his heart rate 5 days per week.   He is taking medications regularly.           Things are getting worse and not sleeping well.  Ready to start medication.    Most the sleep issues due to dry mouth and erection he has nightly.  Not taking sildenafil.  All started with starting trazodone.      Having panic attacks when not sleeping well.    No suicidal plans -has good support.  No guns in the house.    Wants to think about therapy later.              Review of Systems   As above      Objective           Vitals:  No vitals were obtained today due to virtual visit.    Physical Exam   healthy, alert and no distress  PSYCH: Alert and oriented times 3; coherent speech, normal   rate and volume, able to articulate logical thoughts, able   to abstract reason, no tangential thoughts, no hallucinations   or delusions  His affect is normal  RESP: No cough, no audible wheezing, able to talk in full sentences  Remainder of exam unable to be completed due to telephone visits                Phone call duration: 9 minutes  Answers for HPI/ROS submitted by the patient on 3/4/2022  If you checked off any problems, how difficult have these problems made it for you to do your work, take care of things at home, or get along with other people?: Very difficult  PHQ9 TOTAL SCORE: 18  BENNIE 7  TOTAL SCORE: 14

## 2022-03-05 ASSESSMENT — ANXIETY QUESTIONNAIRES: GAD7 TOTAL SCORE: 14

## 2022-03-05 ASSESSMENT — PATIENT HEALTH QUESTIONNAIRE - PHQ9: SUM OF ALL RESPONSES TO PHQ QUESTIONS 1-9: 18

## 2022-03-07 ENCOUNTER — TELEPHONE (OUTPATIENT)
Dept: SURGERY | Facility: CLINIC | Age: 42
End: 2022-03-07

## 2022-03-07 ENCOUNTER — OFFICE VISIT (OUTPATIENT)
Dept: SURGERY | Facility: CLINIC | Age: 42
End: 2022-03-07
Attending: PHYSICIAN ASSISTANT
Payer: COMMERCIAL

## 2022-03-07 VITALS
BODY MASS INDEX: 29.91 KG/M2 | DIASTOLIC BLOOD PRESSURE: 87 MMHG | SYSTOLIC BLOOD PRESSURE: 131 MMHG | HEART RATE: 72 BPM | WEIGHT: 204 LBS

## 2022-03-07 DIAGNOSIS — K40.20 NON-RECURRENT BILATERAL INGUINAL HERNIA WITHOUT OBSTRUCTION OR GANGRENE: Primary | ICD-10-CM

## 2022-03-07 PROCEDURE — 99213 OFFICE O/P EST LOW 20 MIN: CPT | Performed by: SURGERY

## 2022-03-07 NOTE — PROGRESS NOTES
Patient seen in consultation for right inguinal hernia by Crystal Catalan    HPI:  Patient is a 41 year old male  with complaints of bulge on right groin, reducible but will come back out  The patient noticed the symptoms about 3-4 years ago.    Getting more uncomfortable and enlarging with time  Worse after standing for longer periods  Left side can get a tingling or numb type sensation but does not feel bulge  nothing makes the episode better.  Patient has not family history of known hernia problems    Review Of Systems    Skin: negative  Ears/Nose/Throat: negative  Respiratory: No shortness of breath, dyspnea on exertion, cough, or hemoptysis  Cardiovascular: negative  Gastrointestinal: negative  Genitourinary: negative  Musculoskeletal: as above  Neurologic: negative  Hematologic/Lymphatic/Immunologic: negative  Endocrine: negative      Past Medical History:   Diagnosis Date     Hypertension        Past Surgical History:   Procedure Laterality Date     NO HISTORY OF SURGERY         Social History     Socioeconomic History     Marital status:      Spouse name: Not on file     Number of children: Not on file     Years of education: Not on file     Highest education level: Not on file   Occupational History     Not on file   Tobacco Use     Smoking status: Never Smoker     Smokeless tobacco: Never Used   Vaping Use     Vaping Use: Never used   Substance and Sexual Activity     Alcohol use: Yes     Comment: OCC     Drug use: No     Sexual activity: Yes     Partners: Female   Other Topics Concern     Not on file   Social History Narrative     Not on file     Social Determinants of Health     Financial Resource Strain: Not on file   Food Insecurity: Not on file   Transportation Needs: Not on file   Physical Activity: Not on file   Stress: Not on file   Social Connections: Not on file   Intimate Partner Violence: Not on file   Housing Stability: Not on file       Current Outpatient Medications   Medication Sig  Dispense Refill     hydrOXYzine (ATARAX) 25 MG tablet Take 1 tablet (25 mg) by mouth 3 times daily as needed for anxiety 30 tablet 1     losartan-hydrochlorothiazide (HYZAAR) 50-12.5 MG tablet TAKE 1 TABLET BY MOUTH DAILY 90 tablet 1     sertraline (ZOLOFT) 25 MG tablet Take 1 tablet (25 mg) by mouth daily 30 tablet 1     sildenafil (REVATIO) 20 MG tablet Take 1-2 tablets by mouth one hour before sexual activity. 20 tablet 3       Medications and history reviewed    Physical exam:  Vitals: /87   Pulse 72   Wt 92.5 kg (204 lb)   BMI 29.91 kg/m    BMI= Body mass index is 29.91 kg/m .    Constitutional: healthy, alert and no distress  Head: Normocephalic. No masses, lesions, tenderness or abnormalities  Cardiovascular: negative, PMI normal. No lifts, heaves, or thrills. RRR. No murmurs, clicks gallops or rub  Respiratory: negative, Percussion normal. Good diaphragmatic excursion. Lungs clear  Gastrointestinal: Abdomen soft, non-tender. BS normal. No masses, organomegaly  : Normal external genitalia without lesions and male positive for obvious large right inguinal hernia but not extending much into scrotum. Reducible. Small left inguinal hernia found as well, reducible.  Musculoskeletal: extremities normal- no gross deformities noted, gait normal and normal muscle tone  Skin: no suspicious lesions or rashes  Psychiatric: mentation appears normal and affect normal/bright    Assessment:     ICD-10-CM    1. Non-recurrent bilateral inguinal hernia without obstruction or gangrene  K40.20 Adult General Surg Referral     Nanci-Operative Worksheet     Plan: With bilateral hernia I offered a robotic approach. Discussed other options of ongoing waiting though this is a very symptomatic and he does desire repair, as well as open approach. Patient agreeable with plan for robotic MIS surgery.  Risks of surgery discussed including, but not limited to bleeding, infection, recurrence, damage to intra-abdominal contents such  as nerves, blood vessels, bowel or other organs.  Risks of anesthesia also discussed.  Although mesh is a better long term repair if it gets infected it must be removed. Mesh problems such as fracture, erosion or adhesions are possible.  If there is evidence of an infection at time of surgery it will be cancelled and rescheduled to when well.    Discussed massaging hernia back in and using ice if becomes more painful.  If not able to reduce then go to emergency room.  Will schedule. Pt looking at July.    Pepe Ford MD

## 2022-03-07 NOTE — LETTER
3/7/2022         RE: Carlos Alberto Salazar  [address]  [place] [address]        Dear Colleague,    Thank you for referring your patient, Carlos Alberto Slaazar, to the [facility]. Please see a copy of my visit note below.    Patient seen in consultation for right inguinal hernia by Crystal Catalan    HPI:  Patient is a 41 year old male  with complaints of bulge on right groin, reducible but will come back out  The patient noticed the symptoms about 3-4 years ago.    Getting more uncomfortable and enlarging with time  Worse after standing for longer periods  Left side can get a tingling or numb type sensation but does not feel bulge  nothing makes the episode better.  Patient has not family history of known hernia problems    Review Of Systems    Skin: negative  Ears/Nose/Throat: negative  Respiratory: No shortness of breath, dyspnea on exertion, cough, or hemoptysis  Cardiovascular: negative  Gastrointestinal: negative  Genitourinary: negative  Musculoskeletal: as above  Neurologic: negative  Hematologic/Lymphatic/Immunologic: negative  Endocrine: negative      Past Medical History:   Diagnosis Date     Hypertension        Past Surgical History:   Procedure Laterality Date     NO HISTORY OF SURGERY         Social History     Socioeconomic History     Marital status:      Spouse name: Not on file     Number of children: Not on file     Years of education: Not on file     Highest education level: Not on file   Occupational History     Not on file   Tobacco Use     Smoking status: Never Smoker     Smokeless tobacco: Never Used   Vaping Use     Vaping Use: Never used   Substance and Sexual Activity     Alcohol use: Yes     Comment: OCC     Drug use: No     Sexual activity: Yes     Partners: Female   Other Topics Concern     Not on file   Social History Narrative     Not on file     Social Determinants of Health     Financial Resource Strain: Not on file   Food Insecurity: Not on file    Transportation Needs: Not on file   Physical Activity: Not on file   Stress: Not on file   Social Connections: Not on file   Intimate Partner Violence: Not on file   Housing Stability: Not on file       Current Outpatient Medications   Medication Sig Dispense Refill     hydrOXYzine (ATARAX) 25 MG tablet Take 1 tablet (25 mg) by mouth 3 times daily as needed for anxiety 30 tablet 1     losartan-hydrochlorothiazide (HYZAAR) 50-12.5 MG tablet TAKE 1 TABLET BY MOUTH DAILY 90 tablet 1     sertraline (ZOLOFT) 25 MG tablet Take 1 tablet (25 mg) by mouth daily 30 tablet 1     sildenafil (REVATIO) 20 MG tablet Take 1-2 tablets by mouth one hour before sexual activity. 20 tablet 3       Medications and history reviewed    Physical exam:  Vitals: /87   Pulse 72   Wt 92.5 kg (204 lb)   BMI 29.91 kg/m    BMI= Body mass index is 29.91 kg/m .    Constitutional: healthy, alert and no distress  Head: Normocephalic. No masses, lesions, tenderness or abnormalities  Cardiovascular: negative, PMI normal. No lifts, heaves, or thrills. RRR. No murmurs, clicks gallops or rub  Respiratory: negative, Percussion normal. Good diaphragmatic excursion. Lungs clear  Gastrointestinal: Abdomen soft, non-tender. BS normal. No masses, organomegaly  : Normal external genitalia without lesions and male positive for obvious large right inguinal hernia but not extending much into scrotum. Reducible. Small left inguinal hernia found as well, reducible.  Musculoskeletal: extremities normal- no gross deformities noted, gait normal and normal muscle tone  Skin: no suspicious lesions or rashes  Psychiatric: mentation appears normal and affect normal/bright    Assessment:     ICD-10-CM    1. Non-recurrent bilateral inguinal hernia without obstruction or gangrene  K40.20 Adult General Surg Referral     Nanci-Operative Worksheet     Plan: With bilateral hernia I offered a robotic approach. Discussed other options of ongoing waiting though this is a  very symptomatic and he does desire repair, as well as open approach. Patient agreeable with plan for robotic MIS surgery.  Risks of surgery discussed including, but not limited to bleeding, infection, recurrence, damage to intra-abdominal contents such as nerves, blood vessels, bowel or other organs.  Risks of anesthesia also discussed.  Although mesh is a better long term repair if it gets infected it must be removed. Mesh problems such as fracture, erosion or adhesions are possible.  If there is evidence of an infection at time of surgery it will be cancelled and rescheduled to when well.    Discussed massaging hernia back in and using ice if becomes more painful.  If not able to reduce then go to emergency room.  Will schedule. Pt looking at July.    Pepe Ford MD        Again, thank you for allowing me to participate in the care of your patient.        Sincerely,        Pepe Ford MD

## 2022-03-20 ENCOUNTER — HEALTH MAINTENANCE LETTER (OUTPATIENT)
Age: 42
End: 2022-03-20

## 2022-03-31 ENCOUNTER — MYC MEDICAL ADVICE (OUTPATIENT)
Dept: UROLOGY | Facility: CLINIC | Age: 42
End: 2022-03-31

## 2022-04-08 ENCOUNTER — VIRTUAL VISIT (OUTPATIENT)
Dept: UROLOGY | Facility: CLINIC | Age: 42
End: 2022-04-08
Payer: COMMERCIAL

## 2022-04-08 DIAGNOSIS — Z30.09 VASECTOMY EVALUATION: ICD-10-CM

## 2022-04-08 PROCEDURE — 99203 OFFICE O/P NEW LOW 30 MIN: CPT | Mod: GT | Performed by: UROLOGY

## 2022-04-08 NOTE — PROGRESS NOTES
Carlos Alberto is a 42 year old who is being evaluated via a billable video visit.      How would you like to obtain your AVS? MyChart  If the video visit is dropped, the invitation should be resent by: Text to cell phone:    Will anyone else be joining your video visit? No      Video Start Time: 9.20         Subjective   Carlos Alberto is a 42 year old who presents for the following health issues vas evaluation    HPI     Patient is 42 y.o. male who is interested in vasectomy.  He has 3 children.    Review of Systems   Constitutional, HEENT, cardiovascular, pulmonary, gi and gu systems are negative, except as otherwise noted.      Objective           Vitals:  No vitals were obtained today due to virtual visit.    Physical Exam   GENERAL: Healthy, alert and no distress  EYES: Eyes grossly normal to inspection.  No discharge or erythema, or obvious scleral/conjunctival abnormalities.  RESP: No audible wheeze, cough, or visible cyanosis.  No visible retractions or increased work of breathing.    SKIN: Visible skin clear. No significant rash, abnormal pigmentation or lesions.  NEURO: Cranial nerves grossly intact.  Mentation and speech appropriate for age.  PSYCH: Mentation appears normal, affect normal/bright, judgement and insight intact, normal speech and appearance well-groomed.    Vasectomy discussed.  Risks of bleeding/infection/failure rate/chronic testicular pain discussed.  Patient understands need for protection afterwards until negative semen tests.            Video-Visit Details    Type of service:  Video Visit    Video End Time:9:25 AM    Originating Location (pt. Location): Home    Distant Location (provider location):  [facility]     Platform [facility] for [facility] Visit: [facility]

## 2022-04-08 NOTE — PATIENT INSTRUCTIONS
Please call [phone] to schedule your procedure.     Preparation for Vasectomy:  Shave the hair away from the base of your penis and around your testicles.  Wear snug underwear the day of the vasectomy to support your testicles.  Do not take aspirin, ibuprofen, advil, motrin, aleve products one week prior to your vasectomy.        General Vasectomy Information    Vasectomy is a surgery.  If it is successful, it will be impossible for you to ever father children.  The following information regards the male sterilization done by an operation called a vasectomy.  This is done in the physician's office.    The operation done to sterilize the male is easier, safer and much less expensive than the operation done to sterilize the woman.    Sterilization should be considered permanent.  For most males, once the operation is done, it can never me undone.  There are attempts made occasionally to reconnect the tubes that have been cut during the procedure, but this is very difficult and expensive and works only about 50-70% of the time.  In order for any of the physicians in our clinic to do a vasectomy, we require that you consider this a permanent form a sterilization.    A vasectomy can be done at any time, but it is best to think about having it done when you can take at least one day off from work and any excess activities.    Your decision to have a vasectomy should only be made with the following facts clear in mind.    1. First, a vasectomy is only one of several means of birth control.  Many form of temporary contraception are available.  If you have any questions about other methods, please discuss this with your physician.    2. A vasectomy may be unsuccessful in approximately one out of 1000 couples per year.  This occurs when the tubes which are cut during the procedure reconnect themselves.  Sterility cannot be guaranteed.    3. You should be aware that it is the current belief of the medical profession that  a vasectomy procedure does not alter a male physically, physiologically or sexually.  Because each person is a unique individual, there is always the possibility of an adverse phychiatric reaction.  This can be best avoided by being very comfortable in your own mind that you want to have this done, and that you do not want to father any children in the future.  If this is not clear in your mind, this should be further discussed with your physician.    4. You will not notice a change in the volume of your ejaculate since sperm is a very small amount of the semen and it is only the sperm that is stopped from entering the ejaculate after a vasectomy.  Your prostate and seminal vesicle glands really supply most of the semen and this is not at all decreased after a vasectomy.  Also there is no effect on the male hormones.    5. You do not become sterile immediately following a vasectomy due to the fact that there is still sperm remaining in your system that must be eliminated by ejaculation.  For this reason, your sexual partner could still become pregnant for a period of time following the vasectomy operation.  It is necessary that contraceptive measures be used until you receive confirmation from your physician that you are sterile.  It takes approximately 12 ejaculations to clear the semen of sperm, but this can differ in different men.  For this reason, it is very important that your semen be checked for sperm before you are considered sterile, and this should be done approximately 12 weeks after your vasectomy.      6. Vasectomy has risks and benefits.  Among the risks are possible complications resulting from the procedure.  These risks include but are not limited to:   A.  Bleeding, infection, or hematoma occuring during or in the recovery period   from the procedure.   B.  Sperm granuloma or a small pea to walnut sized lump which is a collection of   scar tissue and sperm in your scrotal sack and remains  permanently   C.  There may be an increased risk of prostate cancer although the data is   unclear.

## 2022-05-19 NOTE — TELEPHONE ENCOUNTER
Advised patient Dr Ford does robotic surgeries on Thursdays. This doesn't work for the patient. No schedule at this time.

## 2022-06-08 ENCOUNTER — TELEPHONE (OUTPATIENT)
Dept: SURGERY | Facility: CLINIC | Age: 42
End: 2022-06-08

## 2022-06-08 NOTE — TELEPHONE ENCOUNTER
Type of surgery: Robotic assisted laparoscopic bilateral inguinal hernia repair possible open  CPT 72295, poss [id number]   Non-recurrent bilateral inguinal hernia without obstruction or gangrene K40.20    Location of surgery: [facility]  Date and time of surgery: 07/07/2022  Surgeon: Logan  Pre-Op Appt Date: 06/29/2022  Post-Op Appt Date: 08/01/2022   Packet sent out: Yes  Pre-cert/Authorization completed:  No prior auth needed per [employer] tool.    Date: 6/8/22    Mary Luciano  Financial Securing/Prior Auth Dept  [phone]

## 2022-06-28 ASSESSMENT — PATIENT HEALTH QUESTIONNAIRE - PHQ9
SUM OF ALL RESPONSES TO PHQ QUESTIONS 1-9: 2
SUM OF ALL RESPONSES TO PHQ QUESTIONS 1-9: 2
10. IF YOU CHECKED OFF ANY PROBLEMS, HOW DIFFICULT HAVE THESE PROBLEMS MADE IT FOR YOU TO DO YOUR WORK, TAKE CARE OF THINGS AT HOME, OR GET ALONG WITH OTHER PEOPLE: NOT DIFFICULT AT ALL

## 2022-06-29 ENCOUNTER — OFFICE VISIT (OUTPATIENT)
Dept: FAMILY MEDICINE | Facility: CLINIC | Age: 42
End: 2022-06-29
Payer: COMMERCIAL

## 2022-06-29 VITALS
HEIGHT: 70 IN | TEMPERATURE: 98.1 F | BODY MASS INDEX: 28.12 KG/M2 | OXYGEN SATURATION: 96 % | WEIGHT: 196.4 LBS | HEART RATE: 80 BPM | RESPIRATION RATE: 17 BRPM | SYSTOLIC BLOOD PRESSURE: 128 MMHG | DIASTOLIC BLOOD PRESSURE: 78 MMHG

## 2022-06-29 DIAGNOSIS — K40.20 NON-RECURRENT BILATERAL INGUINAL HERNIA WITHOUT OBSTRUCTION OR GANGRENE: ICD-10-CM

## 2022-06-29 DIAGNOSIS — I10 HTN, GOAL BELOW 140/90: ICD-10-CM

## 2022-06-29 DIAGNOSIS — Z13.220 SCREENING FOR HYPERLIPIDEMIA: ICD-10-CM

## 2022-06-29 DIAGNOSIS — Z01.818 PRE-OP EXAM: Primary | ICD-10-CM

## 2022-06-29 DIAGNOSIS — Z11.59 NEED FOR HEPATITIS C SCREENING TEST: ICD-10-CM

## 2022-06-29 LAB
ANION GAP SERPL CALCULATED.3IONS-SCNC: 5 MMOL/L (ref 3–14)
BUN SERPL-MCNC: 15 MG/DL (ref 7–30)
CALCIUM SERPL-MCNC: 9 MG/DL (ref 8.5–10.1)
CHLORIDE BLD-SCNC: 104 MMOL/L (ref 94–109)
CHOLEST SERPL-MCNC: 236 MG/DL
CO2 SERPL-SCNC: 29 MMOL/L (ref 20–32)
CREAT SERPL-MCNC: 1.11 MG/DL (ref 0.66–1.25)
FASTING STATUS PATIENT QL REPORTED: ABNORMAL
GFR SERPL CREATININE-BSD FRML MDRD: 85 ML/MIN/1.73M2
GLUCOSE BLD-MCNC: 100 MG/DL (ref 70–99)
HCV AB SERPL QL IA: NONREACTIVE
HDLC SERPL-MCNC: 55 MG/DL
LDLC SERPL CALC-MCNC: 153 MG/DL
NONHDLC SERPL-MCNC: 181 MG/DL
POTASSIUM BLD-SCNC: 3.7 MMOL/L (ref 3.4–5.3)
SODIUM SERPL-SCNC: 138 MMOL/L (ref 133–144)
TRIGL SERPL-MCNC: 139 MG/DL

## 2022-06-29 PROCEDURE — 80048 BASIC METABOLIC PNL TOTAL CA: CPT | Performed by: FAMILY MEDICINE

## 2022-06-29 PROCEDURE — 99214 OFFICE O/P EST MOD 30 MIN: CPT | Performed by: FAMILY MEDICINE

## 2022-06-29 PROCEDURE — 93000 ELECTROCARDIOGRAM COMPLETE: CPT | Performed by: FAMILY MEDICINE

## 2022-06-29 PROCEDURE — 86803 HEPATITIS C AB TEST: CPT | Performed by: FAMILY MEDICINE

## 2022-06-29 PROCEDURE — 36415 COLL VENOUS BLD VENIPUNCTURE: CPT | Performed by: FAMILY MEDICINE

## 2022-06-29 PROCEDURE — 80061 LIPID PANEL: CPT | Performed by: FAMILY MEDICINE

## 2022-06-29 NOTE — PATIENT INSTRUCTIONS
General Surgery   [address] [place] [address]   [place] [address]   Phone: [phone]

## 2022-06-29 NOTE — PROGRESS NOTES
[facility]  [address]  [place] [address]  Phone: [phone]  Primary Provider: Daisy Stevens  Pre-op Performing Provider: LUIS ALBERTO LEAL      PREOPERATIVE EVALUATION:  Today's date: 6/29/2022    Carlos Alberto Salazar is a 42 year old male who presents for a preoperative evaluation.    Surgical Information:  Surgery/Procedure: Hernia Surgery   Surgery Location: [place]  Surgeon: Logan  Surgery Date: 7/7/2022  Time of Surgery: unknown   Where patient plans to recover: At home with family  Fax number for surgical facility: Note does not need to be faxed, will be available electronically in Epic.    Type of Anesthesia Anticipated: General    Assessment & Plan     The proposed surgical procedure is considered LOW risk.    Pre-op exam   Due for bilateral inguinal hernia repair.  - EKG 12-lead complete w/read - Clinics  - BASIC METABOLIC PANEL; Future    Non-recurrent bilateral inguinal hernia without obstruction or gangrene    Hernia repair planned for 7/7/22    HTN, goal below 140/90    BP well controlled on Losartan-hydrochlorothiazide; will check electrolytes.  - BASIC METABOLIC PANEL; Future    Screening for hyperlipidemia  - Lipid panel reflex to direct LDL Fasting; Future    Need for hepatitis C screening test  - Hepatitis C Screen Reflex to HCV RNA Quant and Genotype; Future    Possible Sleep Apnea:   STOP-Bang Total Score 6/29/2022   Total Score 3       Risks and Recommendations:  The patient has the following additional risks and recommendations for perioperative complications:   - No identified additional risk factors other than previously addressed    Medication Instructions:  Patient is to take all scheduled medications on the day of surgery EXCEPT for modifications listed below:   - ACE/ARB: May be continued on the day of surgery.    - Diuretics: Taking combo medication    RECOMMENDATION:  APPROVAL GIVEN to proceed with proposed procedure, without  further diagnostic evaluation.    Subjective     HPI related to upcoming procedure: Hernia repair for bilateral inguinal hernias    Preop Questions 6/28/2022   1. Have you ever had a heart attack or stroke? No   2. Have you ever had surgery on your heart or blood vessels, such as a stent placement, a coronary artery bypass, or surgery on an artery in your head, neck, heart, or legs? No   3. Do you have chest pain with activity? No   4. Do you have a history of  heart failure? No   5. Do you currently have a cold, bronchitis or symptoms of other infection? No   6. Do you have a cough, shortness of breath, or wheezing? No   7. Do you or anyone in your family have previous history of blood clots? No   8. Do you or does anyone in your family have a serious bleeding problem such as prolonged bleeding following surgeries or cuts? No   9. Have you ever had problems with anemia or been told to take iron pills? No   10. Have you had any abnormal blood loss such as black, tarry or bloody stools? No   11. Have you ever had a blood transfusion? No   12. Are you willing to have a blood transfusion if it is medically needed before, during, or after your surgery? YES   13. Have you or any of your relatives ever had problems with anesthesia? No   14. Do you have sleep apnea, excessive snoring or daytime drowsiness? YES - sotero guzman   14a. Do you have a CPAP machine? No   15. Do you have any artifical heart valves or other implanted medical devices like a pacemaker, defibrillator, or continuous glucose monitor? No   16. Do you have artificial joints? No   17. Are you allergic to latex? No       Health Care Directive:  Patient does not have a Health Care Directive or Living Will: Discussed advance care planning with patient; however, patient declined at this time.    Preoperative Review of :   reviewed - no record of controlled substances prescribed.      Status of Chronic Conditions:  HYPERTENSION - Patient has longstanding  "history of HTN , currently denies any symptoms referable to elevated blood pressure. Specifically denies chest pain, palpitations, dyspnea, orthopnea, PND or peripheral edema. Blood pressure readings have been in normal range. Current medication regimen is as listed below. Patient denies any side effects of medication.       Review of Systems  Constitutional, neuro, ENT, endocrine, pulmonary, cardiac, gastrointestinal, musculoskeletal, integument and psychiatric systems are negative, except as otherwise noted.    Patient Active Problem List    Diagnosis Date Noted     Moderate episode of recurrent major depressive disorder (H) 02/18/2022     Priority: Medium     Anxiety 03/22/2019     Priority: Medium     Hyperlipidemia LDL goal <130 12/31/2018     Priority: Medium     Benign essential hypertension 12/18/2018     Priority: Medium      Past Medical History:   Diagnosis Date     Hypertension      Past Surgical History:   Procedure Laterality Date     NO HISTORY OF SURGERY       Current Outpatient Medications   Medication Sig Dispense Refill     losartan-hydrochlorothiazide (HYZAAR) 50-12.5 MG tablet TAKE 1 TABLET BY MOUTH DAILY 90 tablet 1     sildenafil (REVATIO) 20 MG tablet Take 1-2 tablets by mouth one hour before sexual activity. 20 tablet 3       No Known Allergies     Social History     Tobacco Use     Smoking status: Never Smoker     Smokeless tobacco: Never Used   Substance Use Topics     Alcohol use: Yes     Comment: OCC     Family History   Problem Relation Age of Onset     Cerebrovascular Disease Father      Coronary Artery Disease Father         around 51 yo      Diabetes Father      History   Drug Use No         Objective     /78 (BP Location: Left arm)   Pulse 80   Temp 98.1  F (36.7  C) (Oral)   Resp 17   Ht 1.775 m (5' 9.88\")   Wt 89.1 kg (196 lb 6.4 oz)   SpO2 96%   BMI 28.28 kg/m      Physical Exam    GENERAL APPEARANCE: healthy, alert and no distress     NECK: no adenopathy and thyroid " normal to palpation     RESP: lungs clear to auscultation - no rales, rhonchi or wheezes     CV: regular rates and rhythm, normal S1 S2, no S3 or S4 and no murmur, click or rub     ABDOMEN:  soft, nontender, no HSM or masses and bowel sounds normal     MS: extremities normal- no gross deformities noted     SKIN: no suspicious lesions or rashes     NEURO: Normal strength and tone, mentation intact and speech normal     PSYCH: mentation appears normal. and affect normal/brighty    Recent Labs   Lab Test 07/26/21  1800      POTASSIUM 3.9   CR 1.02      Diagnostics:  Labs pending at this time.  Results will be reviewed when available.   EKG: appears normal, NSR, normal axis, normal intervals, no acute ST/T changes c/w ischemia, no LVH by voltage criteria    Revised Cardiac Risk Index (RCRI):  The patient has the following serious cardiovascular risks for perioperative complications:   - No serious cardiac risks = 0 points     RCRI Interpretation: 0 points: Class I (very low risk - 0.4% complication rate)      Signed Electronically by: Hardik Kincaid MD  Copy of this evaluation report is provided to requesting physician.      Answers for HPI/ROS submitted by the patient on 6/28/2022  If you checked off any problems, how difficult have these problems made it for you to do your work, take care of things at home, or get along with other people?: Not difficult at all  PHQ9 TOTAL SCORE: 2

## 2022-07-25 ENCOUNTER — OFFICE VISIT (OUTPATIENT)
Dept: SURGERY | Facility: CLINIC | Age: 42
End: 2022-07-25
Payer: COMMERCIAL

## 2022-07-25 VITALS
HEART RATE: 75 BPM | SYSTOLIC BLOOD PRESSURE: 135 MMHG | BODY MASS INDEX: 28.22 KG/M2 | WEIGHT: 196 LBS | DIASTOLIC BLOOD PRESSURE: 96 MMHG

## 2022-07-25 DIAGNOSIS — Z09 S/P BILATERAL INGUINAL HERNIA REPAIR, FOLLOW-UP EXAM: ICD-10-CM

## 2022-07-25 DIAGNOSIS — Z87.19 S/P BILATERAL INGUINAL HERNIA REPAIR, FOLLOW-UP EXAM: ICD-10-CM

## 2022-07-25 DIAGNOSIS — M96.842 POSTOPERATIVE SEROMA OF MUSCULOSKELETAL STRUCTURE AFTER MUSCULOSKELETAL PROCEDURE: Primary | ICD-10-CM

## 2022-07-25 PROCEDURE — 99024 POSTOP FOLLOW-UP VISIT: CPT | Performed by: SURGERY

## 2022-07-25 NOTE — LETTER
7/25/2022         RE: Carlos Alberto Salazar  [address]  [place] [address]        Dear Colleague,    Thank you for referring your patient, Carlos Alberto Salazar, to the [facility]. Please see a copy of my visit note below.    General Surgery Post Op    Pt returns for follow up visit s/p robotic bilateral inguinal hernia repair on 7/7/2022.    Patient has been doing well, tolerating diet. Bowels moving well. Pain controlled.  Felt good for the first week.  After that first week noticed a new bulge in the right groin.  Feels firm but not tender.  Left side feels good.    Physical exam: Vitals: BP (!) 135/96   Pulse 75   Wt 88.9 kg (196 lb)   BMI 28.22 kg/m    BMI= Body mass index is 28.22 kg/m .    Exam:  Constitutional: healthy, alert and no distress  Abdominal incisions appear to be healing well  Right groin with firm round likely fluid collection, not reducible, not tender.  Left groin feels normal without recurrence or other abnormality.    Assessment:     ICD-10-CM    1. Postoperative seroma of musculoskeletal structure after musculoskeletal procedure  M96.842 US Hernia Evaluation   2. S/P bilateral inguinal hernia repair, follow-up exam  Z09      Plan: By exam I suspect this is a seroma or possibly hematoma that is collected in the hernia sac or along the cord.  We will go ahead and check ultrasound to confirm suspicions.  If fluid collection is the diagnosis then would like to give time as long as remains asymptomatic, for body to resorb.  We will let him know results once ultrasound is completed.  Follow-up with me in 2 months for recheck, sooner if issues/worsening.    Pepe Ford MD          Again, thank you for allowing me to participate in the care of your patient.        Sincerely,        Pepe Ford MD

## 2022-07-25 NOTE — PROGRESS NOTES
General Surgery Post Op    Pt returns for follow up visit s/p robotic bilateral inguinal hernia repair on 7/7/2022.    Patient has been doing well, tolerating diet. Bowels moving well. Pain controlled.  Felt good for the first week.  After that first week noticed a new bulge in the right groin.  Feels firm but not tender.  Left side feels good.    Physical exam: Vitals: BP (!) 135/96   Pulse 75   Wt 88.9 kg (196 lb)   BMI 28.22 kg/m    BMI= Body mass index is 28.22 kg/m .    Exam:  Constitutional: healthy, alert and no distress  Abdominal incisions appear to be healing well  Right groin with firm round likely fluid collection, not reducible, not tender.  Left groin feels normal without recurrence or other abnormality.    Assessment:     ICD-10-CM    1. Postoperative seroma of musculoskeletal structure after musculoskeletal procedure  M96.842 US Hernia Evaluation   2. S/P bilateral inguinal hernia repair, follow-up exam  Z09      Plan: By exam I suspect this is a seroma or possibly hematoma that is collected in the hernia sac or along the cord.  We will go ahead and check ultrasound to confirm suspicions.  If fluid collection is the diagnosis then would like to give time as long as remains asymptomatic, for body to resorb.  We will let him know results once ultrasound is completed.  Follow-up with me in 2 months for recheck, sooner if issues/worsening.    Pepe Ford MD

## 2022-07-26 ENCOUNTER — ANCILLARY PROCEDURE (OUTPATIENT)
Dept: ULTRASOUND IMAGING | Facility: CLINIC | Age: 42
End: 2022-07-26
Attending: SURGERY
Payer: COMMERCIAL

## 2022-07-26 DIAGNOSIS — M96.842 POSTOPERATIVE SEROMA OF MUSCULOSKELETAL STRUCTURE AFTER MUSCULOSKELETAL PROCEDURE: ICD-10-CM

## 2022-07-26 PROCEDURE — 76705 ECHO EXAM OF ABDOMEN: CPT | Mod: TC | Performed by: RADIOLOGY

## 2022-09-11 ENCOUNTER — HEALTH MAINTENANCE LETTER (OUTPATIENT)
Age: 42
End: 2022-09-11

## 2022-09-14 ENCOUNTER — OFFICE VISIT (OUTPATIENT)
Dept: SURGERY | Facility: CLINIC | Age: 42
End: 2022-09-14
Payer: COMMERCIAL

## 2022-09-14 VITALS
BODY MASS INDEX: 28.22 KG/M2 | WEIGHT: 196 LBS | DIASTOLIC BLOOD PRESSURE: 79 MMHG | SYSTOLIC BLOOD PRESSURE: 128 MMHG | HEART RATE: 60 BPM

## 2022-09-14 DIAGNOSIS — Z09 S/P BILATERAL INGUINAL HERNIA REPAIR, FOLLOW-UP EXAM: Primary | ICD-10-CM

## 2022-09-14 DIAGNOSIS — Z87.19 S/P BILATERAL INGUINAL HERNIA REPAIR, FOLLOW-UP EXAM: Primary | ICD-10-CM

## 2022-09-14 DIAGNOSIS — M96.840 POSTOPERATIVE HEMATOMA OF MUSCULOSKELETAL STRUCTURE FOLLOWING MUSCULOSKELETAL PROCEDURE: ICD-10-CM

## 2022-09-14 PROCEDURE — 99024 POSTOP FOLLOW-UP VISIT: CPT | Performed by: SURGERY

## 2022-09-14 NOTE — LETTER
9/14/2022         RE: Carlos Alberto Salazar  [address]  [place] [address]        Dear Colleague,    Thank you for referring your patient, Carlos Alberto Salazar, to the [facility]. Please see a copy of my visit note below.    General Surgery Post Op    Pt returns for follow up visit s/p robotic bilateral inguinal hernia repair on 7/7/2022.  Had right inguinal hematoma develop about 1 week postop, asymptomatic    He has noticed that the lump in the right groin has reduced in size significantly.  Not painful.  Able to do all his normal activities without issues.  Left side continues to feel fine.    Physical exam: Vitals: /79   Pulse 60   Wt 88.9 kg (196 lb)   BMI 28.22 kg/m    BMI= Body mass index is 28.22 kg/m .    Exam:  Constitutional: healthy, alert and no distress  Right groin with very small remaining palpable nodular lesion/lump in the area of external ring.  Mildly tender to direct palpation.  This is significantly smaller than at previous exam    Assessment:     ICD-10-CM    1. S/P bilateral inguinal hernia repair, follow-up exam  Z09    2. Postoperative hematoma of musculoskeletal structure following musculoskeletal procedure  M96.840      Plan: The right inguinal postop hematoma continues to improve and is asymptomatic.  With this much improvement I am okay now going to as needed follow-ups.  Activities as tolerated.    Pepe Ford MD          Again, thank you for allowing me to participate in the care of your patient.        Sincerely,        Pepe Ford MD

## 2022-09-14 NOTE — PROGRESS NOTES
General Surgery Post Op    Pt returns for follow up visit s/p robotic bilateral inguinal hernia repair on 7/7/2022.  Had right inguinal hematoma develop about 1 week postop, asymptomatic    He has noticed that the lump in the right groin has reduced in size significantly.  Not painful.  Able to do all his normal activities without issues.  Left side continues to feel fine.    Physical exam: Vitals: /79   Pulse 60   Wt 88.9 kg (196 lb)   BMI 28.22 kg/m    BMI= Body mass index is 28.22 kg/m .    Exam:  Constitutional: healthy, alert and no distress  Right groin with very small remaining palpable nodular lesion/lump in the area of external ring.  Mildly tender to direct palpation.  This is significantly smaller than at previous exam    Assessment:     ICD-10-CM    1. S/P bilateral inguinal hernia repair, follow-up exam  Z09    2. Postoperative hematoma of musculoskeletal structure following musculoskeletal procedure  M96.840      Plan: The right inguinal postop hematoma continues to improve and is asymptomatic.  With this much improvement I am okay now going to as needed follow-ups.  Activities as tolerated.    Pepe Ford MD

## 2022-12-22 ENCOUNTER — OFFICE VISIT (OUTPATIENT)
Dept: FAMILY MEDICINE | Facility: CLINIC | Age: 42
End: 2022-12-22
Payer: OTHER MISCELLANEOUS

## 2022-12-22 VITALS
OXYGEN SATURATION: 94 % | SYSTOLIC BLOOD PRESSURE: 139 MMHG | BODY MASS INDEX: 28.74 KG/M2 | WEIGHT: 199.6 LBS | HEART RATE: 68 BPM | DIASTOLIC BLOOD PRESSURE: 85 MMHG | RESPIRATION RATE: 20 BRPM | TEMPERATURE: 97.4 F

## 2022-12-22 DIAGNOSIS — S41.112A ARM LACERATION, LEFT, INITIAL ENCOUNTER: Primary | ICD-10-CM
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
## 2022-12-22 PROCEDURE — 12002 RPR S/N/AX/GEN/TRNK2.6-7.5CM: CPT | Performed by: FAMILY MEDICINE

## 2022-12-22 NOTE — LETTER
REPORT OF WORK COMP    [address] [address]  [phone]      PATIENT DATA    Employee Name: Carlos Alberto Salazar      : 1980     #: [id number]    Work related injury: Yes  Employer at time of injury: [employer]  Employer contact & phone: Alfreda Adrian [phone]  Employed elsewhere? No  Workers' Compensation Carrier/Managed Care Plan:  unknown    Today's date: 2022  Date of injury: 22   Date of first visit: 22     PROVIDER EVALUATION: Please fill in as needed.  Please give copy to employee for employer.    1. Diagnosis: left arm laceration    2. Treatment: sutures.  3. Medication: none  NOTE: When ordering a medication, [place] Rules require Work Comp or WC on prescriptions.    4. No work from: able to work   5. Return to work date: 22    ** WITH RESTRICTIONS? Yes, with work restrictions: * Restricted lifting - Maximum lift in pounds: 30  * Limited repetitive motion.  * Keep injury site clean and dry  DURATION OF LIMITATIONS: one week      RESTRICTIONS: Unlimited unless listed.  Restrictions apply to home and leisure also.  If work restrictions is not available, the employee is totally disabled.        Next appointment: 1 week    CC: Employer, Managed Care Plan/Payor, Patient

## 2022-12-22 NOTE — PATIENT INSTRUCTIONS
Watch for signs of infection including redness, drainage, increased swelling or pain and follow-up right away if that occurs.  Otherwise follow-up in 7 to 10 days for suture removal

## 2022-12-22 NOTE — PROGRESS NOTES
Clinical Decision Making:    At the end of the encounter, I discussed results, diagnosis, medications. Discussed red flags for immediate return to clinic/ER, as well as indications for follow up if no improvement. Patient understood and agreed to plan. Patient was stable for discharge.      ICD-10-CM    1. Arm laceration, left, initial encounter  S41.112A       2. Fall from ladder, initial encounter  W11.XXXA         Keep bandage on for 24 hours and then it is okay to shower.  No soaking the area for a week.  Watch for signs of infection including redness, drainage, increased swelling or pain and follow-up right away if that occurs.  Otherwise follow-up in 7 to 10 days for suture removal printed patient education on suture care  Letter done for work comp and work restrictions        There are no Patient Instructions on file for this visit.   Return in about 1 week (around 12/29/2022) for Follow up for suture removal.      chief complaint    HPI:  Carlos Alberto Salazar is a 42 year old male who presents today complaining of laceration to his left forearm which happened at work today.  He was on a ladder working at a drop ceiling and the ladder gave way.  He caught himself on the ceiling and then fell.  A coworker was with him at the time.  He did not hit his head or lose consciousness.  He has some soreness at his right pit inside, some left hand soreness and then got a cut on his left arm near the elbow.  Patient is right-handed  He did not feel lightheaded or dizzy before he fell.    History obtained from chart review and the patient.    Reviewed past medical history, medications, allergies but information excluded from note.     Review of systems  negative except listed in HPI    Vitals:    12/22/22 1226   BP: 139/85   BP Location: Right arm   Patient Position: Sitting   Cuff Size: Adult Regular   Pulse: 68   Resp: 20   Temp: 97.4  F (36.3  C)   TempSrc: Oral   SpO2: 94%   Weight: 90.5 kg (199 lb 9.6 oz)       Physical  Exam  Vitals noted and within normal limits  In general he is alert, oriented, and in no acute distress  Right hip with no pain with internal and external rotation.  No tenderness to palpation of the proximal femur or the pelvic girdle.  No tenderness to the lumbar spine.  Mild tenderness to palpation of the soft tissue superior to the right iliac crest.  No current erythema or ecchymoses.  No tenderness to the right ribs.  Left hand with normal radial pulse and sensation intact.  He can flex and extend the fingers and strength is normal.  There is 2 very small punctate lesions on his forehead.  No surrounding erythema or swelling.  Left elbow medially with a linear, clean laceration which is 4 cm long and through the epidermis and dermis.  No current bleeding.  Area is soaked for 10 to 15 minutes  Sterile technique used  Plain Xylocaine 2% is used for local anesthesia  Sterile technique used  5 simple interrupted sutures placed with 4-0 Ethilon  Area cleaned  Triple antibiotic ointment is covered with a Telfa nonstick bandage and then gauze wrapped around the arm and secured with tape.

## 2022-12-30 ENCOUNTER — ALLIED HEALTH/NURSE VISIT (OUTPATIENT)
Dept: NURSING | Facility: CLINIC | Age: 42
End: 2022-12-30
Payer: COMMERCIAL

## 2022-12-30 DIAGNOSIS — Z02.6 ENCOUNTER RELATED TO WORKER'S COMPENSATION CLAIM: ICD-10-CM

## 2022-12-30 DIAGNOSIS — S41.112S ARM LACERATION, LEFT, SEQUELA: Primary | ICD-10-CM

## 2022-12-30 PROCEDURE — 99207 PR NO CHARGE NURSE ONLY: CPT

## 2022-12-30 NOTE — NURSING NOTE
THIS IS A WORKMAN'S COMP INJURY     Suture removal:     Date sutures applied: 12/22/22         Where (setting) in which they applied:Urgent Care visit- [facility]    Description:  Type: sutures  Location: back of L arm    History:    Cause of laceration: fall from ladder at work and he thinks his arm scraped some metal on the way down    Accompanying Signs & Symptoms:   Redness: No  Warmth: No  Drainage: No  Still bleeding: No  Fevers: No    Last tetanus shot: last tetanus booster 4 years ago.  Dec 2018    Signs and symptoms of infection and when to return to clinic reviewed with patient and the patient states they understand this.    Zeina Wetzel BSN, RN

## 2023-01-03 DIAGNOSIS — I10 BENIGN ESSENTIAL HYPERTENSION: ICD-10-CM

## 2023-01-04 RX ORDER — LOSARTAN POTASSIUM AND HYDROCHLOROTHIAZIDE 12.5; 5 MG/1; MG/1
TABLET ORAL
Qty: 90 TABLET | Refills: 0 | Status: SHIPPED | OUTPATIENT
Start: 2023-01-04 | End: 2023-01-26

## 2023-01-25 NOTE — PROGRESS NOTES
"  Assessment & Plan     Recurrent major depressive disorder, in full remission (H)  Stable, currently in remission.  We will plan to recheck next year.    Benign essential hypertension  Stable-blood pressure well controlled today in clinic.  Will update labs.  Refill sent.  Encouraged to increase activity and more meals prepared at home.  Plan to follow-up in 1 year.  - losartan-hydrochlorothiazide (HYZAAR) 50-12.5 MG tablet; Take 1 tablet by mouth daily  - Lipid panel reflex to direct LDL Fasting; Future  - Basic metabolic panel; Future  - Albumin Random Urine Quantitative with Creat Ratio; Future    Review of the result(s) of each unique test - labs  Ordering of each unique test  Prescription drug management  25 minutes spent on the date of the encounter doing chart review, history and exam, documentation and further activities per the note     BMI:   Estimated body mass index is 30.09 kg/m  as calculated from the following:    Height as of 6/29/22: 1.775 m (5' 9.88\").    Weight as of this encounter: 94.8 kg (209 lb).       Return in about 1 year (around 1/26/2024) for A Physical Exam, with fasting labs.    MASTER Silva CNP  [facility] [place] Car[place]o is a 42 year old, presenting for the following health issues:  Recheck Medication      History of Present Illness       Hypertension: He presents for follow up of hypertension.  He does check blood pressure  regularly outside of the clinic. Outpatient blood pressures have not been over 140/90. He does not follow a low salt diet.      Today's PHQ-9         PHQ-9 Total Score: 11    PHQ-9 Q9 Thoughts of better off dead/self-harm past 2 weeks :   Not at all    How difficult have these problems made it for you to do your work, take care of things at home, or get along with other people: Not difficult at all  Today's BENNIE-7 Score: 11       Here today for blood pressure check.  Is fasting today for labs.  Checks blood pressure at home. " Usually gets 130/80. Does occasionally forget to take meds.  No chest pain, palpitations or shortness of breath.  No dizziness or lightheaded.  Eats out nearly every meal.  Is active at work only, not outside of work.      Review of Systems   Constitutional: Negative for fatigue.   HENT: Negative for congestion, ear pain, rhinorrhea, sinus pressure and sore throat.    Respiratory: Negative for cough, shortness of breath and wheezing.    Cardiovascular: Negative for chest pain and palpitations.   Gastrointestinal: Negative for abdominal pain, constipation and diarrhea.   Genitourinary: Negative for dysuria and frequency.   Musculoskeletal: Negative for arthralgias and joint swelling.   Skin: Negative for rash.   Neurological: Negative for dizziness, light-headedness, numbness and headaches.   Psychiatric/Behavioral: Negative for dysphoric mood and sleep disturbance. The patient is not nervous/anxious.           Objective    /82   Pulse 67   Temp 98.1  F (36.7  C) (Tympanic)   Resp 12   Wt 94.8 kg (209 lb)   SpO2 96%   BMI 30.09 kg/m    Body mass index is 30.09 kg/m .  Physical Exam  Constitutional:       Appearance: He is well-developed.   HENT:      Right Ear: Tympanic membrane and external ear normal.      Left Ear: Tympanic membrane and external ear normal.      Mouth/Throat:      Pharynx: Uvula midline.   Neck:      Thyroid: No thyromegaly.      Vascular: No carotid bruit.   Cardiovascular:      Rate and Rhythm: Normal rate and regular rhythm.      Heart sounds: Normal heart sounds.   Pulmonary:      Effort: Pulmonary effort is normal.      Breath sounds: Normal breath sounds.   Abdominal:      General: Bowel sounds are normal.      Palpations: Abdomen is soft.   Skin:     General: Skin is warm and dry.   Neurological:      Mental Status: He is alert.

## 2023-01-26 ENCOUNTER — OFFICE VISIT (OUTPATIENT)
Dept: FAMILY MEDICINE | Facility: CLINIC | Age: 43
End: 2023-01-26
Payer: COMMERCIAL

## 2023-01-26 VITALS
BODY MASS INDEX: 30.09 KG/M2 | TEMPERATURE: 98.1 F | HEART RATE: 67 BPM | SYSTOLIC BLOOD PRESSURE: 132 MMHG | WEIGHT: 209 LBS | DIASTOLIC BLOOD PRESSURE: 82 MMHG | OXYGEN SATURATION: 96 % | RESPIRATION RATE: 12 BRPM

## 2023-01-26 DIAGNOSIS — F33.42 RECURRENT MAJOR DEPRESSIVE DISORDER, IN FULL REMISSION (H): ICD-10-CM

## 2023-01-26 DIAGNOSIS — I10 BENIGN ESSENTIAL HYPERTENSION: ICD-10-CM

## 2023-01-26 LAB
ANION GAP SERPL CALCULATED.3IONS-SCNC: 8 MMOL/L (ref 3–14)
BUN SERPL-MCNC: 19 MG/DL (ref 7–30)
CALCIUM SERPL-MCNC: 9.6 MG/DL (ref 8.5–10.1)
CHLORIDE BLD-SCNC: 110 MMOL/L (ref 94–109)
CHOLEST SERPL-MCNC: 284 MG/DL
CO2 SERPL-SCNC: 25 MMOL/L (ref 20–32)
CREAT SERPL-MCNC: 0.97 MG/DL (ref 0.66–1.25)
CREAT UR-MCNC: 173 MG/DL
FASTING STATUS PATIENT QL REPORTED: YES
GFR SERPL CREATININE-BSD FRML MDRD: >90 ML/MIN/1.73M2
GLUCOSE BLD-MCNC: 108 MG/DL (ref 70–99)
HDLC SERPL-MCNC: 56 MG/DL
LDLC SERPL CALC-MCNC: 189 MG/DL
MICROALBUMIN UR-MCNC: 11 MG/L
MICROALBUMIN/CREAT UR: 6.36 MG/G CR (ref 0–17)
NONHDLC SERPL-MCNC: 228 MG/DL
POTASSIUM BLD-SCNC: 3.9 MMOL/L (ref 3.4–5.3)
SODIUM SERPL-SCNC: 143 MMOL/L (ref 133–144)
TRIGL SERPL-MCNC: 197 MG/DL

## 2023-01-26 PROCEDURE — 82043 UR ALBUMIN QUANTITATIVE: CPT | Performed by: NURSE PRACTITIONER

## 2023-01-26 PROCEDURE — 82570 ASSAY OF URINE CREATININE: CPT | Performed by: NURSE PRACTITIONER

## 2023-01-26 PROCEDURE — 99213 OFFICE O/P EST LOW 20 MIN: CPT | Performed by: NURSE PRACTITIONER

## 2023-01-26 PROCEDURE — 80061 LIPID PANEL: CPT | Performed by: NURSE PRACTITIONER

## 2023-01-26 PROCEDURE — 36415 COLL VENOUS BLD VENIPUNCTURE: CPT | Performed by: NURSE PRACTITIONER

## 2023-01-26 PROCEDURE — 80048 BASIC METABOLIC PNL TOTAL CA: CPT | Performed by: NURSE PRACTITIONER

## 2023-01-26 RX ORDER — LOSARTAN POTASSIUM AND HYDROCHLOROTHIAZIDE 12.5; 5 MG/1; MG/1
1 TABLET ORAL DAILY
Qty: 90 TABLET | Refills: 3 | Status: SHIPPED | OUTPATIENT
Start: 2023-01-26 | End: 2024-04-13

## 2023-01-26 ASSESSMENT — ENCOUNTER SYMPTOMS
HEADACHES: 0
JOINT SWELLING: 0
RHINORRHEA: 0
FREQUENCY: 0
SINUS PRESSURE: 0
NUMBNESS: 0
SORE THROAT: 0
ARTHRALGIAS: 0
SLEEP DISTURBANCE: 0
CONSTIPATION: 0
DIZZINESS: 0
COUGH: 0
DIARRHEA: 0
DYSPHORIC MOOD: 0
DYSURIA: 0
ABDOMINAL PAIN: 0
NERVOUS/ANXIOUS: 0
FATIGUE: 0
PALPITATIONS: 0
LIGHT-HEADEDNESS: 0
SHORTNESS OF BREATH: 0
WHEEZING: 0

## 2023-01-26 ASSESSMENT — ANXIETY QUESTIONNAIRES
GAD7 TOTAL SCORE: 11
3. WORRYING TOO MUCH ABOUT DIFFERENT THINGS: SEVERAL DAYS
8. IF YOU CHECKED OFF ANY PROBLEMS, HOW DIFFICULT HAVE THESE MADE IT FOR YOU TO DO YOUR WORK, TAKE CARE OF THINGS AT HOME, OR GET ALONG WITH OTHER PEOPLE?: NOT DIFFICULT AT ALL
IF YOU CHECKED OFF ANY PROBLEMS ON THIS QUESTIONNAIRE, HOW DIFFICULT HAVE THESE PROBLEMS MADE IT FOR YOU TO DO YOUR WORK, TAKE CARE OF THINGS AT HOME, OR GET ALONG WITH OTHER PEOPLE: NOT DIFFICULT AT ALL
GAD7 TOTAL SCORE: 11
6. BECOMING EASILY ANNOYED OR IRRITABLE: NEARLY EVERY DAY
7. FEELING AFRAID AS IF SOMETHING AWFUL MIGHT HAPPEN: SEVERAL DAYS
5. BEING SO RESTLESS THAT IT IS HARD TO SIT STILL: MORE THAN HALF THE DAYS
GAD7 TOTAL SCORE: 11
7. FEELING AFRAID AS IF SOMETHING AWFUL MIGHT HAPPEN: SEVERAL DAYS
2. NOT BEING ABLE TO STOP OR CONTROL WORRYING: SEVERAL DAYS
4. TROUBLE RELAXING: MORE THAN HALF THE DAYS
1. FEELING NERVOUS, ANXIOUS, OR ON EDGE: SEVERAL DAYS

## 2023-01-26 ASSESSMENT — PATIENT HEALTH QUESTIONNAIRE - PHQ9
10. IF YOU CHECKED OFF ANY PROBLEMS, HOW DIFFICULT HAVE THESE PROBLEMS MADE IT FOR YOU TO DO YOUR WORK, TAKE CARE OF THINGS AT HOME, OR GET ALONG WITH OTHER PEOPLE: NOT DIFFICULT AT ALL
SUM OF ALL RESPONSES TO PHQ QUESTIONS 1-9: 11
SUM OF ALL RESPONSES TO PHQ QUESTIONS 1-9: 11

## 2023-01-26 ASSESSMENT — PAIN SCALES - GENERAL: PAINLEVEL: NO PAIN (0)

## 2023-04-30 ENCOUNTER — HEALTH MAINTENANCE LETTER (OUTPATIENT)
Age: 43
End: 2023-04-30

## 2024-04-11 DIAGNOSIS — I10 BENIGN ESSENTIAL HYPERTENSION: ICD-10-CM

## 2024-04-13 RX ORDER — LOSARTAN POTASSIUM AND HYDROCHLOROTHIAZIDE 12.5; 5 MG/1; MG/1
1 TABLET ORAL DAILY
Qty: 30 TABLET | Refills: 0 | Status: SHIPPED | OUTPATIENT
Start: 2024-04-13 | End: 2024-05-13

## 2024-05-13 DIAGNOSIS — I10 BENIGN ESSENTIAL HYPERTENSION: ICD-10-CM

## 2024-05-13 RX ORDER — LOSARTAN POTASSIUM AND HYDROCHLOROTHIAZIDE 12.5; 5 MG/1; MG/1
1 TABLET ORAL DAILY
Qty: 30 TABLET | Refills: 0 | Status: SHIPPED | OUTPATIENT
Start: 2024-05-13 | End: 2024-06-19

## 2024-05-13 NOTE — LETTER
May 14, 2024      Carlos Alberto Salazar  [address]  [place] [address]        Dear Carlos Alberto    Please call our clinic at phone [phone] as soon as possible to schedule an appointment with your primary care provider for a follow up on your medications and fasting labs. This appointment is needed for any additional refills to be approved.      Sincerely,      [facility]

## 2024-06-18 ASSESSMENT — PATIENT HEALTH QUESTIONNAIRE - PHQ9
SUM OF ALL RESPONSES TO PHQ QUESTIONS 1-9: 10
SUM OF ALL RESPONSES TO PHQ QUESTIONS 1-9: 10
10. IF YOU CHECKED OFF ANY PROBLEMS, HOW DIFFICULT HAVE THESE PROBLEMS MADE IT FOR YOU TO DO YOUR WORK, TAKE CARE OF THINGS AT HOME, OR GET ALONG WITH OTHER PEOPLE: NOT DIFFICULT AT ALL

## 2024-06-19 ENCOUNTER — VIRTUAL VISIT (OUTPATIENT)
Dept: FAMILY MEDICINE | Facility: CLINIC | Age: 44
End: 2024-06-19
Payer: COMMERCIAL

## 2024-06-19 DIAGNOSIS — E78.5 HYPERLIPIDEMIA LDL GOAL <130: Primary | ICD-10-CM

## 2024-06-19 DIAGNOSIS — R73.09 ABNORMAL GLUCOSE: ICD-10-CM

## 2024-06-19 DIAGNOSIS — I10 BENIGN ESSENTIAL HYPERTENSION: ICD-10-CM

## 2024-06-19 PROBLEM — F33.1 MODERATE EPISODE OF RECURRENT MAJOR DEPRESSIVE DISORDER (H): Status: RESOLVED | Noted: 2022-02-18 | Resolved: 2024-06-19

## 2024-06-19 PROCEDURE — 99213 OFFICE O/P EST LOW 20 MIN: CPT | Mod: 95 | Performed by: NURSE PRACTITIONER

## 2024-06-19 RX ORDER — LOSARTAN POTASSIUM AND HYDROCHLOROTHIAZIDE 12.5; 5 MG/1; MG/1
1 TABLET ORAL DAILY
Qty: 90 TABLET | Refills: 0 | Status: SHIPPED | OUTPATIENT
Start: 2024-06-19 | End: 2024-09-13

## 2024-06-19 NOTE — PROGRESS NOTES
Carlos Alberto is a 44 year old who is being evaluated via a billable video visit.    How would you like to obtain your AVS? [employer]  If the video visit is dropped, the invitation should be resent by: Text to cell phone: [phone]  Will anyone else be joining your video visit? No      Assessment & Plan     Benign essential hypertension  To check blood pressures at home and send Gather message with blood pressure readings in 2 weeks.  Will arrange for fasting lab only appointment.  90-day prescription sent.  Will need labs and home blood pressure readings for further refills  - losartan-hydrochlorothiazide (HYZAAR) 50-12.5 MG tablet; Take 1 tablet by mouth daily  - Albumin Random Urine Quantitative with Creat Ratio; Future  - Basic metabolic panel; Future    Hyperlipidemia LDL goal <130  Previous lipids reviewed.  Will arrange for fasting lab appointment  - Lipid panel reflex to direct LDL Fasting; Future    Abnormal glucose  - Basic metabolic panel; Future  - Hemoglobin A1c; Future      Subjective   Carlos Alberto is a 44 year old, presenting for the following health issues:  Recheck Medication      6/19/2024     7:23 AM   Additional Questions   Roomed by Patient completed questions via [employer]     History of Present Illness       Hypertension: He presents for follow up of hypertension.  He does check blood pressure  regularly outside of the clinic. Outpatient blood pressures have not been over 140/90. He does not follow a low salt diet.     He eats 0-1 servings of fruits and vegetables daily.He consumes 1 sweetened beverage(s) daily.He exercises with enough effort to increase his heart rate 60 or more minutes per day.  He exercises with enough effort to increase his heart rate 5 days per week.   He is taking medications regularly.         Objective         Video visit completed.  Needs have refills of blood pressure medication.  When was first started on medication was having migraine type headaches.  Since starting medication  has not had any headaches.  Does check blood pressures at home, unable to recall readings-reports they have been good.  Rarely forgets to take it.  If does forget it is usually on a weekend.  Not having any dizziness, lightheaded, chest pain, palpitations or shortness of breath.      Is not having depression or anxiety.  Feels like everything is going really well      Vitals:  No vitals were obtained today due to virtual visit.    Physical Exam  Constitutional:       General: He is not in acute distress.     Appearance: Normal appearance. He is not toxic-appearing.   HENT:      Nose: No congestion.   Eyes:      General: Lids are normal.   Pulmonary:      Effort: Pulmonary effort is normal. No tachypnea, bradypnea or respiratory distress.   Skin:     Coloration: Skin is not ashen, cyanotic, jaundiced or pale.   Neurological:      Mental Status: He is alert.   Psychiatric:         Mood and Affect: Mood normal.         Speech: Speech normal.         Behavior: Behavior normal. Behavior is cooperative.            Video-Visit Details    Type of service:  Video Visit   Originating Location (pt. Location): Home    Distant Location (provider location):  On-site  Platform used for Video Visit: [employer]  Signed Electronically by: MASTER Silva CNP

## 2024-06-19 NOTE — PATIENT INSTRUCTIONS
Please check your blood pressure 3 or 4 times per week and send me your blood pressure readings in 2 weeks or so.    Will need to schedule a fasting lab appointment.  Office staff should be reaching out to you to assist you in scheduling.    I did send a refill of your medication to the pharmacy for you.

## 2024-06-24 ENCOUNTER — LAB (OUTPATIENT)
Dept: LAB | Facility: CLINIC | Age: 44
End: 2024-06-24
Payer: COMMERCIAL

## 2024-06-24 DIAGNOSIS — R73.09 ABNORMAL GLUCOSE: ICD-10-CM

## 2024-06-24 DIAGNOSIS — E78.5 HYPERLIPIDEMIA LDL GOAL <130: ICD-10-CM

## 2024-06-24 DIAGNOSIS — I10 BENIGN ESSENTIAL HYPERTENSION: ICD-10-CM

## 2024-06-24 LAB — HBA1C MFR BLD: 5.6 % (ref 0–5.6)

## 2024-06-24 PROCEDURE — 82043 UR ALBUMIN QUANTITATIVE: CPT

## 2024-06-24 PROCEDURE — 83036 HEMOGLOBIN GLYCOSYLATED A1C: CPT

## 2024-06-24 PROCEDURE — 80061 LIPID PANEL: CPT

## 2024-06-24 PROCEDURE — 80048 BASIC METABOLIC PNL TOTAL CA: CPT

## 2024-06-24 PROCEDURE — 82570 ASSAY OF URINE CREATININE: CPT

## 2024-06-24 PROCEDURE — 36415 COLL VENOUS BLD VENIPUNCTURE: CPT

## 2024-06-25 LAB
ANION GAP SERPL CALCULATED.3IONS-SCNC: 11 MMOL/L (ref 7–15)
BUN SERPL-MCNC: 11.8 MG/DL (ref 6–20)
CALCIUM SERPL-MCNC: 9.6 MG/DL (ref 8.6–10)
CHLORIDE SERPL-SCNC: 101 MMOL/L (ref 98–107)
CHOLEST SERPL-MCNC: 279 MG/DL
CREAT SERPL-MCNC: 1.01 MG/DL (ref 0.67–1.17)
CREAT UR-MCNC: 195 MG/DL
DEPRECATED HCO3 PLAS-SCNC: 26 MMOL/L (ref 22–29)
EGFRCR SERPLBLD CKD-EPI 2021: >90 ML/MIN/1.73M2
FASTING STATUS PATIENT QL REPORTED: YES
FASTING STATUS PATIENT QL REPORTED: YES
GLUCOSE SERPL-MCNC: 91 MG/DL (ref 70–99)
HDLC SERPL-MCNC: 43 MG/DL
LDLC SERPL CALC-MCNC: 170 MG/DL
MICROALBUMIN UR-MCNC: <12 MG/L
MICROALBUMIN/CREAT UR: NORMAL MG/G{CREAT}
NONHDLC SERPL-MCNC: 236 MG/DL
POTASSIUM SERPL-SCNC: 3.9 MMOL/L (ref 3.4–5.3)
SODIUM SERPL-SCNC: 138 MMOL/L (ref 135–145)
TRIGL SERPL-MCNC: 332 MG/DL

## 2024-06-26 ENCOUNTER — MYC MEDICAL ADVICE (OUTPATIENT)
Dept: FAMILY MEDICINE | Facility: CLINIC | Age: 44
End: 2024-06-26
Payer: COMMERCIAL

## 2024-07-07 ENCOUNTER — HEALTH MAINTENANCE LETTER (OUTPATIENT)
Age: 44
End: 2024-07-07

## 2024-09-13 DIAGNOSIS — I10 BENIGN ESSENTIAL HYPERTENSION: ICD-10-CM

## 2024-09-13 RX ORDER — LOSARTAN POTASSIUM AND HYDROCHLOROTHIAZIDE 12.5; 5 MG/1; MG/1
1 TABLET ORAL DAILY
Qty: 90 TABLET | Refills: 0 | Status: SHIPPED | OUTPATIENT
Start: 2024-09-13

## 2025-07-19 ENCOUNTER — HEALTH MAINTENANCE LETTER (OUTPATIENT)
Age: 45
End: 2025-07-19

## 2025-08-05 ENCOUNTER — MYC REFILL (OUTPATIENT)
Dept: FAMILY MEDICINE | Facility: CLINIC | Age: 45
End: 2025-08-05
Payer: COMMERCIAL

## 2025-08-05 DIAGNOSIS — I10 BENIGN ESSENTIAL HYPERTENSION: ICD-10-CM

## 2025-08-05 RX ORDER — LOSARTAN POTASSIUM AND HYDROCHLOROTHIAZIDE 12.5; 5 MG/1; MG/1
1 TABLET ORAL DAILY
Qty: 90 TABLET | Refills: 0 | Status: SHIPPED | OUTPATIENT
Start: 2025-08-05